# Patient Record
Sex: MALE | Race: WHITE | NOT HISPANIC OR LATINO | Employment: OTHER | URBAN - METROPOLITAN AREA
[De-identification: names, ages, dates, MRNs, and addresses within clinical notes are randomized per-mention and may not be internally consistent; named-entity substitution may affect disease eponyms.]

---

## 2017-01-14 DIAGNOSIS — I10 HYPERTENSION GOAL BP (BLOOD PRESSURE) < 140/90: Primary | ICD-10-CM

## 2017-01-17 NOTE — TELEPHONE ENCOUNTER
LISINOPRIL-HCTZ 10-12.5 MG  Last Written Prescription Date: 5/18/16  Last Fill Quantity: 90, # refills: 1  Last Office Visit with G, P or [facility] prescribing provider: 11/1/16       POTASSIUM   Date Value Ref Range Status   04/14/2015 3.2* 3.4 - 5.3 mmol/L Final     CREATININE   Date Value Ref Range Status   04/14/2015 1.08 0.66 - 1.25 mg/dL Final     BP Readings from Last 3 Encounters:   11/01/16 102/67   05/18/16 130/80   03/09/16 118/90

## 2017-01-18 RX ORDER — LISINOPRIL/HYDROCHLOROTHIAZIDE 10-12.5 MG
TABLET ORAL
Qty: 90 TABLET | Refills: 0 | Status: SHIPPED | OUTPATIENT
Start: 2017-01-18 | End: 2017-05-26

## 2017-01-18 NOTE — TELEPHONE ENCOUNTER
Prescription approved per [facility] Refill Protocol.  Nishi Buchanan RN- Triage [employer]

## 2017-05-24 DIAGNOSIS — I10 HYPERTENSION GOAL BP (BLOOD PRESSURE) < 140/90: ICD-10-CM

## 2017-05-24 NOTE — TELEPHONE ENCOUNTER
lisinopril-hydrochlorothiazide (PRINZIDE/ZESTORETIC) 10-12.5 MG per tablet      Last Written Prescription Date: 1/18/17  Last Fill Quantity: 90, # refills: 0  Last Office Visit with G, [facility] or [facility] prescribing provider: 5/18/16       Potassium   Date Value Ref Range Status   04/14/2015 3.2 (L) 3.4 - 5.3 mmol/L Final     Creatinine   Date Value Ref Range Status   04/14/2015 1.08 0.66 - 1.25 mg/dL Final     BP Readings from Last 3 Encounters:   11/01/16 102/67   05/18/16 130/80   03/09/16 118/90

## 2017-05-26 RX ORDER — LISINOPRIL/HYDROCHLOROTHIAZIDE 10-12.5 MG
TABLET ORAL
Qty: 90 TABLET | Refills: 0 | OUTPATIENT
Start: 2017-05-26

## 2017-05-26 RX ORDER — LISINOPRIL/HYDROCHLOROTHIAZIDE 10-12.5 MG
1 TABLET ORAL DAILY
Qty: 30 TABLET | Refills: 0 | Status: SHIPPED | OUTPATIENT
Start: 2017-05-26 | End: 2017-06-07

## 2017-05-26 NOTE — TELEPHONE ENCOUNTER
Routing refill request to provider for review/approval because:  Daly given x1 and patient did not follow up, please advise

## 2017-06-07 ENCOUNTER — OFFICE VISIT (OUTPATIENT)
Dept: FAMILY MEDICINE | Facility: CLINIC | Age: 57
End: 2017-06-07
Payer: COMMERCIAL

## 2017-06-07 VITALS
DIASTOLIC BLOOD PRESSURE: 68 MMHG | RESPIRATION RATE: 16 BRPM | BODY MASS INDEX: 27.34 KG/M2 | HEART RATE: 78 BPM | SYSTOLIC BLOOD PRESSURE: 100 MMHG | OXYGEN SATURATION: 97 % | WEIGHT: 213 LBS | HEIGHT: 74 IN | TEMPERATURE: 97.3 F

## 2017-06-07 DIAGNOSIS — M19.179 POST-TRAUMATIC OSTEOARTHRITIS OF ANKLE, UNSPECIFIED LATERALITY: ICD-10-CM

## 2017-06-07 DIAGNOSIS — G89.29 CHRONIC PAIN OF LEFT ANKLE: ICD-10-CM

## 2017-06-07 DIAGNOSIS — G89.4 CHRONIC PAIN SYNDROME: ICD-10-CM

## 2017-06-07 DIAGNOSIS — M25.572 CHRONIC PAIN OF LEFT ANKLE: ICD-10-CM

## 2017-06-07 DIAGNOSIS — I10 HYPERTENSION GOAL BP (BLOOD PRESSURE) < 140/90: Primary | ICD-10-CM

## 2017-06-07 DIAGNOSIS — M54.2 CERVICALGIA: ICD-10-CM

## 2017-06-07 DIAGNOSIS — F11.21 OPIOID DEPENDENCE IN REMISSION (H): ICD-10-CM

## 2017-06-07 PROCEDURE — 36415 COLL VENOUS BLD VENIPUNCTURE: CPT | Performed by: FAMILY MEDICINE

## 2017-06-07 PROCEDURE — 80061 LIPID PANEL: CPT | Performed by: FAMILY MEDICINE

## 2017-06-07 PROCEDURE — 86803 HEPATITIS C AB TEST: CPT | Performed by: FAMILY MEDICINE

## 2017-06-07 PROCEDURE — 99214 OFFICE O/P EST MOD 30 MIN: CPT | Performed by: FAMILY MEDICINE

## 2017-06-07 PROCEDURE — 80048 BASIC METABOLIC PNL TOTAL CA: CPT | Performed by: FAMILY MEDICINE

## 2017-06-07 NOTE — MR AVS SNAPSHOT
"              After Visit Summary   6/7/2017    Dakota Myers    MRN: [id number]           Patient Information     Date Of Birth          1960        Visit Information        Provider Department      6/7/2017 3:40 PM Jagruti Ahumada MD [facility]        Today's Diagnoses     Hypertension goal BP (blood pressure) < 140/90    -  1    Opioid dependence in remission (H)        Chronic pain syndrome        Cervicalgia        Post-traumatic osteoarthritis of ankle, unspecified laterality        Chronic pain of left ankle           Follow-ups after your visit        Who to contact     If you have questions or need follow up information about today's clinic visit or your schedule please contact [facility] directly at [phone].  Normal or non-critical lab and imaging results will be communicated to you by MyChart, letter or phone within 4 business days after the clinic has received the results. If you do not hear from us within 7 days, please contact the clinic through "[employer]hart or phone. If you have a critical or abnormal lab result, we will notify you by phone as soon as possible.  Submit refill requests through [employer] or call your pharmacy and they will forward the refill request to us. Please allow 3 business days for your refill to be completed.          Additional Information About Your Visit        MyChart Information     [employer] lets you send messages to your doctor, view your test results, renew your prescriptions, schedule appointments and more. To sign up, go to [email or web address] . Click on \"Log in\" on the left side of the screen, which will take you to the Welcome page. Then click on \"Sign up Now\" on the right side of the page.     You will be asked to enter the access code listed below, as well as some personal information. Please follow the directions to create your username and password.     Your access code is: " [email or web address]  Expires: 2017  4:22 PM     Your access code will  in 90 days. If you need help or a new code, please call your [facility] or [phone].        Care EveryWhere ID     This is your Care EveryWhere ID. This could be used by other organizations to access your [place] medical records  [id number]        Your Vitals Were     Pulse Temperature Respirations Height Pulse Oximetry BMI (Body Mass Index)    78 97.3  F (36.3  C) (Tympanic) 16 6' 2\" (1.88 m) 97% 27.35 kg/m2       Blood Pressure from Last 3 Encounters:   17 100/68   16 102/67   16 130/80    Weight from Last 3 Encounters:   17 213 lb (96.6 kg)   16 213 lb (96.6 kg)   16 214 lb (97.1 kg)              We Performed the Following     Basic metabolic panel     Hepatitis C Screen Reflex to HCV RNA Quant and Genotype     Lipid panel reflex to direct LDL          Today's Medication Changes          These changes are accurate as of: 17  4:22 PM.  If you have any questions, ask your nurse or doctor.               Stop taking these medicines if you haven't already. Please contact your care team if you have questions.     lisinopril-hydrochlorothiazide 10-12.5 MG per tablet   Commonly known as:  PRINZIDE/ZESTORETIC   Stopped by:  Jagruti Ahumada MD                    Primary Care Provider Office Phone # Fax #    Jagruti Ahumada [phone]       [address] [address]        Thank you!     Thank you for choosing [facility]  for your care. Our goal is always to provide you with excellent care. Hearing back from our patients is one way we can continue to improve our services. Please take a few minutes to complete the written survey that you may receive in the mail after your visit with us. Thank you!             Your Updated Medication List - Protect others around you: Learn how to safely use, store and throw " away your medicines at www.disposemymeds.org.      Notice  As of 6/7/2017  4:22 PM    You have not been prescribed any medications.
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
## 2017-06-07 NOTE — LETTER
[facility]  [address]  [address]  [place] [address] [phone]                                                                                                           Dakota Myers  [address] [place] [address]    June 9, 2017      Dear Dakota,    The results of your recent tests were reviewed and are enclosed.   Results for orders placed or performed in visit on 06/07/17   Basic metabolic panel   Result Value Ref Range    Sodium 137 133 - 144 mmol/L    Potassium 4.2 3.4 - 5.3 mmol/L    Chloride 101 94 - 109 mmol/L    Carbon Dioxide 22 20 - 32 mmol/L    Anion Gap 14 3 - 14 mmol/L    Glucose 86 70 - 99 mg/dL    Urea Nitrogen 16 7 - 30 mg/dL    Creatinine 1.17 0.66 - 1.25 mg/dL    GFR Estimate 64 >60 mL/min/1.7m2    GFR Estimate If Black 78 >60 mL/min/1.7m2    Calcium 9.3 8.5 - 10.1 mg/dL   Hepatitis C Screen Reflex to HCV RNA Quant and Genotype   Result Value Ref Range    Hepatitis C Antibody  NR     Nonreactive   Assay performance characteristics have not been established for newborns,   infants, and children     Lipid panel reflex to direct LDL   Result Value Ref Range    Cholesterol 296 (H) <200 mg/dL    Triglycerides 397 (H) <150 mg/dL    HDL Cholesterol 44 >39 mg/dL    LDL Cholesterol Calculated 173 (H) <100 mg/dL    Non HDL Cholesterol 252 (H) <130 mg/dL     It was nice to see you recently!  I wanted to let you know your recent test results - details of the results can be found below. Briefly:     1. Your hep C and glucose results look great; everything is normal.   2. Your cholesterol is creeping up into the range where we think you would benefit from a Statin Cholesterol lowering medication.  I imagine you would NOT want to start this - so look at eating healthier!  More fruits and veggies, fewer processed foods, more fish and whole grains.  Google the Mediterranean Diet for details. Let's keep an eye on it and recheck next year.  If  you DO want to start a statin medication to lower your risk of heart attack and stroke, let me know.     Let me know if you have any questions about this, or other concerns.       Thank you for choosing [facility].  We appreciate the opportunity to serve you and look forward to supporting your healthcare needs in the future.    If you have any questions or concerns, please call me or my staff at [phone].      Sincerely,    Jagruti Ahumada MD

## 2017-06-07 NOTE — NURSING NOTE
"Chief Complaint   Patient presents with     Hypertension       Initial /68  Pulse 78  Temp 97.3  F (36.3  C) (Tympanic)  Resp 16  Ht 6' 2\" (1.88 m)  Wt 213 lb (96.6 kg)  SpO2 97%  BMI 27.35 kg/m2 Estimated body mass index is 27.35 kg/(m^2) as calculated from the following:    Height as of this encounter: 6' 2\" (1.88 m).    Weight as of this encounter: 213 lb (96.6 kg).  Medication Reconciliation: complete   Annita De Jesus CMA    "

## 2017-06-08 LAB
ANION GAP SERPL CALCULATED.3IONS-SCNC: 14 MMOL/L (ref 3–14)
BUN SERPL-MCNC: 16 MG/DL (ref 7–30)
CALCIUM SERPL-MCNC: 9.3 MG/DL (ref 8.5–10.1)
CHLORIDE SERPL-SCNC: 101 MMOL/L (ref 94–109)
CHOLEST SERPL-MCNC: 296 MG/DL
CO2 SERPL-SCNC: 22 MMOL/L (ref 20–32)
CREAT SERPL-MCNC: 1.17 MG/DL (ref 0.66–1.25)
GFR SERPL CREATININE-BSD FRML MDRD: 64 ML/MIN/1.7M2
GLUCOSE SERPL-MCNC: 86 MG/DL (ref 70–99)
HDLC SERPL-MCNC: 44 MG/DL
LDLC SERPL CALC-MCNC: 173 MG/DL
NONHDLC SERPL-MCNC: 252 MG/DL
POTASSIUM SERPL-SCNC: 4.2 MMOL/L (ref 3.4–5.3)
SODIUM SERPL-SCNC: 137 MMOL/L (ref 133–144)
TRIGL SERPL-MCNC: 397 MG/DL

## 2017-06-09 LAB — HCV AB SERPL QL IA: NORMAL

## 2017-06-09 NOTE — PROGRESS NOTES
Hi Team 3:  Please sent a lab result with the following note.  Thank you!    Dear Dakota,    It was nice to see you recently!  I wanted to let you know your recent test results - details of the results can be found below. Briefly:    1. Your hep C and glucose results look great; everything is normal.  2. Your cholesterol is creeping up into the range where we think you would benefit from a Statin Cholesterol lowering medication.  I imagine you would NOT want to start this - so look at eating healthier!  More fruits and veggies, fewer processed foods, more fish and whole grains.  Google the Mediterranean Diet for details. Let's keep an eye on it and recheck next year.  If you DO want to start a statin medication to lower your risk of heart attack and stroke, let me know.    Let me know if you have any questions about this, or other concerns.    Best,  Jagruti Ahumada MD  Family Medicine  [facility]  [phone]         Results for orders placed or performed in visit on 06/07/17  -Basic metabolic panel       Result                                            Value                         Ref Range                       Sodium                                            137                           133 - 144 mmol/L                Potassium                                         4.2                           3.4 - 5.3 mmol/L                Chloride                                          101                           94 - 109 mmol/L                 Carbon Dioxide                                    22                            20 - 32 mmol/L                  Anion Gap                                         14                            3 - 14 mmol/L                   Glucose                                           86                            70 - 99 mg/dL                   Urea Nitrogen                                     16                            7 - 30 mg/dL                     Creatinine                                        1.17                          0.66 - 1.25 mg/dL               GFR Estimate                                      64                            >60 mL/min/1.7m2                GFR Estimate If Black                             78                            >60 mL/min/1.7m2                Calcium                                           9.3                           8.5 - 10.1 mg/dL           -Hepatitis C Screen Reflex to HCV RNA Quant and Genotype       Result                                            Value                         Ref Range                       Hepatitis C Antibody                                                            NR                          Nonreactive    Assay performance characteristics have not been established for newborns,    infants, and children     -Lipid panel reflex to direct LDL       Result                                            Value                         Ref Range                       Cholesterol                                       296 (H)                       <200 mg/dL                      Triglycerides                                     397 (H)                       <150 mg/dL                      HDL Cholesterol                                   44                            >39 mg/dL                       LDL Cholesterol Calculated                        173 (H)                       <100 mg/dL                      Non HDL Cholesterol                               252 (H)                       <130 mg/dL

## 2022-07-26 ENCOUNTER — TELEPHONE (OUTPATIENT)
Dept: FAMILY MEDICINE | Facility: CLINIC | Age: 62
End: 2022-07-26

## 2022-07-26 NOTE — TELEPHONE ENCOUNTER
Reason for Call:  Other appointment    Detailed comments: Please put appt on the wait list for Dr. Ahumada. Patient is hoping to see her before scheduled date which is set for 12/15. He was quite upset that he has not been in for quite some time to see her and is looking to see her asap due to needing some referrals as well.     Phone Number Patient can be reached at: Cell number on file:    Telephone Information:   Mobile [phone]       Best Time: any time throughout the day     Can we leave a detailed message on this number? YES    Call taken on 7/26/2022 at 12:42 PM by Migel Conde

## 2022-12-29 ENCOUNTER — OFFICE VISIT (OUTPATIENT)
Dept: FAMILY MEDICINE | Facility: CLINIC | Age: 62
End: 2022-12-29
Payer: COMMERCIAL

## 2022-12-29 VITALS
OXYGEN SATURATION: 98 % | HEART RATE: 83 BPM | HEIGHT: 74 IN | WEIGHT: 206 LBS | SYSTOLIC BLOOD PRESSURE: 134 MMHG | RESPIRATION RATE: 16 BRPM | BODY MASS INDEX: 26.44 KG/M2 | DIASTOLIC BLOOD PRESSURE: 87 MMHG | TEMPERATURE: 97.4 F

## 2022-12-29 DIAGNOSIS — R03.0 ELEVATED BP WITHOUT DIAGNOSIS OF HYPERTENSION: ICD-10-CM

## 2022-12-29 DIAGNOSIS — Z13.220 SCREENING FOR HYPERLIPIDEMIA: ICD-10-CM

## 2022-12-29 DIAGNOSIS — R97.20 ELEVATED PROSTATE SPECIFIC ANTIGEN (PSA): ICD-10-CM

## 2022-12-29 DIAGNOSIS — Z12.5 SCREENING FOR PROSTATE CANCER: ICD-10-CM

## 2022-12-29 DIAGNOSIS — Z12.11 SCREEN FOR COLON CANCER: ICD-10-CM

## 2022-12-29 DIAGNOSIS — M19.172 POST-TRAUMATIC OSTEOARTHRITIS OF LEFT FOOT: ICD-10-CM

## 2022-12-29 DIAGNOSIS — M25.572 CHRONIC PAIN OF LEFT ANKLE: ICD-10-CM

## 2022-12-29 DIAGNOSIS — G89.29 CHRONIC PAIN OF LEFT ANKLE: ICD-10-CM

## 2022-12-29 DIAGNOSIS — Z00.00 ROUTINE GENERAL MEDICAL EXAMINATION AT A HEALTH CARE FACILITY: Primary | ICD-10-CM

## 2022-12-29 DIAGNOSIS — N20.0 NEPHROLITHIASIS: ICD-10-CM

## 2022-12-29 DIAGNOSIS — M19.179 POST-TRAUMATIC OSTEOARTHRITIS OF ANKLE, UNSPECIFIED LATERALITY: ICD-10-CM

## 2022-12-29 DIAGNOSIS — Z84.89 FAMILY HISTORY OF GENETIC DISEASE: ICD-10-CM

## 2022-12-29 DIAGNOSIS — Z82.0 FAMILY HISTORY OF ALZHEIMER'S DISEASE: ICD-10-CM

## 2022-12-29 LAB
ANION GAP SERPL CALCULATED.3IONS-SCNC: 13 MMOL/L (ref 7–15)
BUN SERPL-MCNC: 18.4 MG/DL (ref 8–23)
CALCIUM SERPL-MCNC: 9.8 MG/DL (ref 8.8–10.2)
CHLORIDE SERPL-SCNC: 103 MMOL/L (ref 98–107)
CHOLEST SERPL-MCNC: 248 MG/DL
CREAT SERPL-MCNC: 1.18 MG/DL (ref 0.67–1.17)
DEPRECATED HCO3 PLAS-SCNC: 25 MMOL/L (ref 22–29)
GFR SERPL CREATININE-BSD FRML MDRD: 70 ML/MIN/1.73M2
GLUCOSE SERPL-MCNC: 92 MG/DL (ref 70–99)
HBA1C MFR BLD: 5.2 % (ref 0–5.6)
HDLC SERPL-MCNC: 40 MG/DL
LDLC SERPL CALC-MCNC: 167 MG/DL
NONHDLC SERPL-MCNC: 208 MG/DL
POTASSIUM SERPL-SCNC: 4 MMOL/L (ref 3.4–5.3)
PSA SERPL-MCNC: 21.9 NG/ML (ref 0–4.5)
SODIUM SERPL-SCNC: 141 MMOL/L (ref 136–145)
TRIGL SERPL-MCNC: 203 MG/DL

## 2022-12-29 PROCEDURE — G0103 PSA SCREENING: HCPCS | Performed by: FAMILY MEDICINE

## 2022-12-29 PROCEDURE — 99214 OFFICE O/P EST MOD 30 MIN: CPT | Mod: 25 | Performed by: FAMILY MEDICINE

## 2022-12-29 PROCEDURE — 80048 BASIC METABOLIC PNL TOTAL CA: CPT | Performed by: FAMILY MEDICINE

## 2022-12-29 PROCEDURE — 99386 PREV VISIT NEW AGE 40-64: CPT | Mod: 25 | Performed by: FAMILY MEDICINE

## 2022-12-29 PROCEDURE — 90472 IMMUNIZATION ADMIN EACH ADD: CPT | Performed by: FAMILY MEDICINE

## 2022-12-29 PROCEDURE — 36415 COLL VENOUS BLD VENIPUNCTURE: CPT | Performed by: FAMILY MEDICINE

## 2022-12-29 PROCEDURE — 80061 LIPID PANEL: CPT | Performed by: FAMILY MEDICINE

## 2022-12-29 PROCEDURE — 90715 TDAP VACCINE 7 YRS/> IM: CPT | Performed by: FAMILY MEDICINE

## 2022-12-29 PROCEDURE — 90750 HZV VACC RECOMBINANT IM: CPT | Performed by: FAMILY MEDICINE

## 2022-12-29 PROCEDURE — 90471 IMMUNIZATION ADMIN: CPT | Performed by: FAMILY MEDICINE

## 2022-12-29 PROCEDURE — 83036 HEMOGLOBIN GLYCOSYLATED A1C: CPT | Performed by: FAMILY MEDICINE

## 2022-12-29 ASSESSMENT — ENCOUNTER SYMPTOMS
CHILLS: 0
NAUSEA: 0
MYALGIAS: 0
PALPITATIONS: 0
SHORTNESS OF BREATH: 0
ARTHRALGIAS: 1
COUGH: 0
HEARTBURN: 0
CONSTIPATION: 0
ABDOMINAL PAIN: 0
DYSURIA: 0
DIARRHEA: 0
JOINT SWELLING: 0
HEMATURIA: 0
SORE THROAT: 0
FREQUENCY: 0
DIZZINESS: 0
EYE PAIN: 0
HEADACHES: 0
HEMATOCHEZIA: 0
NERVOUS/ANXIOUS: 0
FEVER: 0
PARESTHESIAS: 0
WEAKNESS: 0

## 2022-12-29 ASSESSMENT — PAIN SCALES - GENERAL: PAINLEVEL: NO PAIN (0)

## 2022-12-29 NOTE — NURSING NOTE
Prior to immunization administration, verified patients identity using patient s name and date of birth. Please see Immunization Activity for additional information.     Screening Questionnaire for Adult Immunization    Are you sick today?   No   Do you have allergies to medications, food, a vaccine component or latex?   No   Have you ever had a serious reaction after receiving a vaccination?   No   Do you have a long-term health problem with heart, lung, kidney, or metabolic disease (e.g., diabetes), asthma, a blood disorder, no spleen, complement component deficiency, a cochlear implant, or a spinal fluid leak?  Are you on long-term aspirin therapy?   No   Do you have cancer, leukemia, HIV/AIDS, or any other immune system problem?   No   Do you have a parent, brother, or sister with an immune system problem?   No   In the past 3 months, have you taken medications that affect  your immune system, such as prednisone, other steroids, or anticancer drugs; drugs for the treatment of rheumatoid arthritis, Crohn s disease, or psoriasis; or have you had radiation treatments?   No   Have you had a seizure, or a brain or other nervous system problem?   No   During the past year, have you received a transfusion of blood or blood    products, or been given immune (gamma) globulin or antiviral drug?   No   For women: Are you pregnant or is there a chance you could become       pregnant during the next month?   No   Have you received any vaccinations in the past 4 weeks?   No     Immunization questionnaire answers were all negative.        Per orders of Dr. gaytan, injection of tdap and shingles given by Emilie Mustafa MA. Patient instructed to remain in clinic for 15 minutes afterwards, and to report any adverse reaction to me immediately.       Screening performed by Emilie Mustafa MA on 12/29/2022 at 8:47 AM.

## 2022-12-29 NOTE — PATIENT INSTRUCTIONS
For your blood pressure:  Check your blood pressure first thing in the morning after going to the bathroom, before eating, sitting quietly x 5 minutes  Aim for 3 times a week  Note your blood pressure on a paper log or on your phone  In ~2 weeks, send me a message on [employer] with your results.  Your goal is <140/90, even better is <130/80.  Low sodium, high potassium (DASH) diet.    Think of Nature as a multivitamin that you should take every day.  Make an effort to spend time outside every day.    If you spend [place] in [place] vitamin D 400-1000 daily is a good idea October-April.    Social connections are also like a multivitamin; they give us micro-boosts that keep us healthy and happy. Talk to the check out person in the store, connect with your neighbors.  Make an effort to maintain and sustain social connections in your life.    Food is Medicine. The MIND or Mediterranean diet are the best for heart and brain health as well as have a lower lifetime cancer risk.    Weight lifting exercise 2-3x per week is excellent for bone health and maintaining muscle mass, particularly if you are over 40.  It's also helpful in reducing anxiety and boosting mood.    Move your body every day (exercise!).  Exercise is the most effective way to boost mood, reduce anxiety and depression, reduce risks of many chronic diseases and age well.  Find something you enjoy and do it regularly (walk, run, take a dance class, join a gym, ski, roller-blade, get into yoga, learn ellie chi...)    Prioritize your sleep! Adults age 26-64 need 7-9 hours of sleep a night.  Older adults 65+ need 7-8 hours of sleep a night.  Studies show that people who sleep seven hours a night are healthier and live longer. Sleeping less than seven hours is associated with a range of health problems including obesity, dementia, heart disease, depression and impaired immune function.    Patient Education      Preventive Health Recommendations  Male Ages 50 -  64    Yearly exam:             See your health care provider every year in order to  o   Review health changes.   o   Discuss preventive care.    o   Review your medicines if your doctor has prescribed any.   Have a cholesterol test every 5 years, or more frequently if you are at risk for high cholesterol/heart disease.   Have a diabetes test (fasting glucose) every three years. If you are at risk for diabetes, you should have this test more often.   Have a colonoscopy at age 50, or have a yearly FIT test (stool test). These exams will check for colon cancer.    Talk with your health care provider about whether or not a prostate cancer screening test (PSA) is right for you.  You should be tested each year for STDs (sexually transmitted diseases), if you re at risk.     Shots: Get a flu shot each year. Get a tetanus shot every 10 years.     Nutrition:  Eat at least 5 servings of fruits and vegetables daily.   Eat whole-grain bread, whole-wheat pasta and brown rice instead of white grains and rice.   Get adequate Calcium and Vitamin D.     Lifestyle  Exercise for at least 150 minutes a week (30 minutes a day, 5 days a week). This will help you control your weight and prevent disease.   Limit alcohol to one drink per day.   No smoking.   Wear sunscreen to prevent skin cancer.   See your dentist every six months for an exam and cleaning.   See your eye doctor every 1 to 2 years.

## 2022-12-29 NOTE — PROGRESS NOTES
SUBJECTIVE:   CC: Dakota is an 62 year old who presents for preventative health visit.   Patient has been advised of split billing requirements and indicates understanding: Yes  Healthy Habits:     Getting at least 3 servings of Calcium per day:  Yes    Bi-annual eye exam:  NO    Dental care twice a year:  Yes    Sleep apnea or symptoms of sleep apnea:  None    Diet:  Vegetarian/vegan    Frequency of exercise:  1 day/week    Duration of exercise:  Less than 15 minutes    Taking medications regularly:  Yes    Medication side effects:  None    PHQ-2 Total Score: 0    Additional concerns today:  Yes    62 year old who I haven't seen since 2017.  Also:    1. Due for colonoscopy.    2. blood pressure was high in [place].  Was running late that day.  Might have cuff at home, hasn't been checking.  BP Readings from Last 6 Encounters:   12/29/22 134/87   06/07/17 100/68   11/01/16 102/67   05/18/16 130/80   03/09/16 118/90   10/28/15 (!) 153/97     3. Referral for orthotics for shoes.  Chronic ankle and foot pain from posttraumatic arthritis    4. Dad had alzheimer's in 60s.  Wants baseline testing cog function    5. [facility] study - they are going to test him for gene for Alzheimer's.  Thoughts?    6. Sister - recommended genetic testing for HRCCC - she was heterozygous.  He does have leiomyomas    Today's PHQ-2 Score:   PHQ-2 ( 1999 [employer]) 12/29/2022   Q1: Little interest or pleasure in doing things 0   Q2: Feeling down, depressed or hopeless 0   PHQ-2 Score 0   Q1: Little interest or pleasure in doing things Not at all   Q2: Feeling down, depressed or hopeless Not at all   PHQ-2 Score 0       Have you ever done Advance Care Planning? (For example, a Health Directive, POLST, or a discussion with a medical provider or your loved ones about your wishes): No, advance care planning information given to patient to review.  Patient plans to discuss their wishes with loved ones or provider.      Social History     Tobacco Use      "Smoking status: Former     Years: 15.00     Types: Cigarettes     Quit date: 10/8/2010     Years since quittin.2     Smokeless tobacco: Never   Substance Use Topics     Alcohol use: Yes     Comment: 3-4 per week     If you drink alcohol do you typically have >3 drinks per day or >7 drinks per week? No    Alcohol Use 2022   Prescreen: >3 drinks/day or >7 drinks/week? Not Applicable   Prescreen: >3 drinks/day or >7 drinks/week? -       Last PSA: No results found for: PSA    Reviewed orders with patient. Reviewed health maintenance and updated orders accordingly - Yes      Reviewed and updated as needed this visit by clinical staff   Tobacco  Allergies  Meds  Problems  Med Hx  Surg Hx  Fam Hx          Reviewed and updated as needed this visit by Provider   Tobacco   Meds  Problems  Med Hx  Surg Hx  Fam Hx             Review of Systems   Constitutional: Negative for chills and fever.   HENT: Negative for congestion, ear pain, hearing loss and sore throat.    Eyes: Negative for pain and visual disturbance.   Respiratory: Negative for cough and shortness of breath.    Cardiovascular: Negative for chest pain, palpitations and peripheral edema.   Gastrointestinal: Negative for abdominal pain, constipation, diarrhea, heartburn, hematochezia and nausea.   Genitourinary: Negative for dysuria, frequency, genital sores, hematuria, impotence, penile discharge and urgency.   Musculoskeletal: Positive for arthralgias. Negative for joint swelling and myalgias.   Skin: Negative for rash.   Neurological: Negative for dizziness, weakness, headaches and paresthesias.   Psychiatric/Behavioral: Negative for mood changes. The patient is not nervous/anxious.          OBJECTIVE:   /87 (BP Location: Right arm, Patient Position: Sitting, Cuff Size: Adult Regular)   Pulse 83   Temp 97.4  F (36.3  C) (Tympanic)   Resp 16   Ht 1.88 m (6' 2\")   Wt 93.4 kg (206 lb)   SpO2 98%   BMI 26.45 kg/m      Physical " Exam  GENERAL: healthy, alert and no distress  EYES: Eyes grossly normal to inspection, PERRL and conjunctivae and sclerae normal  HENT: ear canals and TM's normal, nose and mouth without ulcers or lesions  NECK: no adenopathy, no asymmetry, masses, or scars and thyroid normal to palpation  RESP: lungs clear to auscultation - no rales, rhonchi or wheezes  CV: regular rate and rhythm, normal S1 S2, no S3 or S4, no murmur, click or rub, no peripheral edema and peripheral pulses strong  ABDOMEN: soft, nontender, no hepatosplenomegaly, no masses and bowel sounds normal  MS: no gross musculoskeletal defects noted, no edema  SKIN: leimyomas left upper arm  NEURO: Normal strength and tone, mentation intact and speech normal  PSYCH: mentation appears normal, affect normal/bright    Diagnostic Test Results:  Labs reviewed in Epic    ASSESSMENT/PLAN:   Dakota was seen today for physical.    Diagnoses and all orders for this visit:    Routine general medical examination at a health care facility    PSA: screening, done today    Colon: due, last in 2016 and on q5 yr, ordered    Immunizations: due for Shingrix, TDAP.  Done today    Discussed healthy lifestyle and aging recommendations including regular exercise, adequate and regular sleep, 5+ fruits and veggies daily. He is good with all of above except perhaps exercise, we discussed some strategies for increasing  -     Hemoglobin A1c; Future  -     Hemoglobin A1c    Elevated outside blood pressure without diagnosis of hypertension goal BP (blood pressure) < 140/90    Normotensive today    Plan in patient instructions:  Patient Instructions   For your blood pressure:  Check your blood pressure first thing in the morning after going to the bathroom, before eating, sitting quietly x 5 minutes  Aim for 3 times a week  Note your blood pressure on a paper log or on your phone  In ~2 weeks, send me a message on eTutor with your results.  Your goal is <140/90, even better is  "<130/80.  Low sodium, high potassium (DASH) diet.  -     BASIC METABOLIC PANEL; Future  -     BASIC METABOLIC PANEL    Screen for colon cancer  -     Colonoscopy Screening  Referral; Future    Screening for hyperlipidemia  -     Lipid panel reflex to direct LDL Non-fasting; Future  -     Lipid panel reflex to direct LDL Non-fasting    Family history of genetic disease - HLRCC (hereditary leiomyomatosis and renal cell carcinoma)  Comments:  Sister  Rare  Referred genetics  May have as has leiomyomatosis - reviewed chart and in 2013 we offered testing for this per derm visit.  If positive will defer to genetics for recs for RCC screening.  Orders:  -     Adult Oncology/Hematology  Referral; Future    Family history of Alzheimer's disease - dad, in 60's    No symptoms, but would like baseline cognitive testing as concerned    Ordered     Discussed MIND diet (he is already doing essentially, vegan and whole plants based)  -     Adult Neuropsychology Referral; Future    Chronic pain of left ankle  Post-traumatic osteoarthritis of ankle, unspecified laterality  Post-traumatic osteoarthritis of left foot    Orthotics ordered  -     Orthotics and Prosthetics DME Orthotic; Foot Orthotics; Bilateral    Other orders  -     REVIEW OF HEALTH MAINTENANCE PROTOCOL ORDERS  -     ZOSTER VACCINE RECOMBINANT ADJUVANTED (SHINGRIX)  -     TDAP VACCINE (Adacel, Boostrix)        Patient has been advised of split billing requirements and indicates understanding: Yes      COUNSELING:   Reviewed preventive health counseling, as reflected in patient instructions      BMI:   Estimated body mass index is 26.45 kg/m  as calculated from the following:    Height as of this encounter: 1.88 m (6' 2\").    Weight as of this encounter: 93.4 kg (206 lb).   Weight management plan: Discussed healthy diet and exercise guidelines      He reports that he quit smoking about 12 years ago. His smoking use included cigarettes. He has never used " smokeless tobacco.        Jagruti Ahumada MD  [facility]

## 2022-12-30 ENCOUNTER — MYC MEDICAL ADVICE (OUTPATIENT)
Dept: FAMILY MEDICINE | Facility: CLINIC | Age: 62
End: 2022-12-30

## 2022-12-31 NOTE — TELEPHONE ENCOUNTER
I called and spoke with Dakota.  Explained elevated PSA and that it's a screening test, not a diagnosis of cancer but when this elevated (>20) we're more concerned and I want him to see urology within next 2 weeks.    As far as genetic tests go, we discussed and he'd like to put this on the back burner for now as wants to address elevated PSA first.      Answered all questions, let him know to reach out if he had further questions OR if he doesn't get urology appointment within 2 wks.    Dr. Jagruti Ahumada MD / [facility]

## 2023-01-02 ENCOUNTER — MYC MEDICAL ADVICE (OUTPATIENT)
Dept: CALL CENTER | Age: 63
End: 2023-01-02

## 2023-01-02 ENCOUNTER — TELEPHONE (OUTPATIENT)
Dept: GASTROENTEROLOGY | Facility: CLINIC | Age: 63
End: 2023-01-02

## 2023-01-02 ENCOUNTER — TELEPHONE (OUTPATIENT)
Dept: UROLOGY | Facility: CLINIC | Age: 63
End: 2023-01-02

## 2023-01-02 NOTE — TELEPHONE ENCOUNTER
[facility] Call Center    Phone Message    May a detailed message be left on voicemail: yes     Reason for Call: Appointment Intake    Referring Provider Name: Jagruti Ahumada MD  Diagnosis and/or Symptoms:     Elevated prostate specific antigen (PSA)  PSA >20 - new testing and dx.    Patient being referred for Urgent Appointment (3-5 days) by referring provider. Sending encounter message for clinic review and follow-up with patient for scheduling. Thank you!    Action Taken: Message routed to:  [facility] ([facility] Urology    Travel Screening: Not Applicable

## 2023-01-02 NOTE — TELEPHONE ENCOUNTER
Screening Questions  BLUE  KIND OF PREP RED  LOCATION [review exclusion criteria] GREEN  SEDATION TYPE        Y Are you active on mychart?       [place] Ordering/Referring Provider?         What type of coverage do you have?      N Have you had a positive covid test in the last 14 days?     26.6 1. BMI  [BMI 40+ - review exclusion criteria]    Y  2. Are you able to give consent for your medical care? [IF NO,RN REVIEW]        N  3. Are you taking any prescription pain medications on a routine schedule?        3a. EXTENDED PREP What kind of prescription?     N 4. Do you have any chemical dependencies such as alcohol, street drugs, or methadone?    N 5. Do you have any history of post-traumatic stress syndrome, severe anxiety or history of psychosis?      **If yes 3- 5 , please schedule with MAC sedation.**          IF YES TO ANY 6 - 10 - HOSPITAL SETTING ONLY.     N 6.   Do you need assistance transferring?     N 7.   Have you had a heart or lung transplant?    N 8.   Are you currently on dialysis?   N 9.   Do you use daily home oxygen?   N 10. Do you take nitroglycerin?   10a.  If yes, how often?     11. [FEMALES]   Are you currently pregnant?    11a.  If yes, how many weeks? [ Greater than 12 weeks, OR NEEDED]    N 12. Do you have Pulmonary Hypertension? *NEED PAC APPT AT [facility]*     N 13. [review exclusion criteria]  Do you have any implantable devices in your body (pacemaker, defib, LVAD)?    N 14. In the past 6 months, have you had any heart related issues including cardiomyopathy or heart attack?     14a.  If yes, did it require cardiac stenting if so when?     N 15. Have you had a stroke or Transient ischemic attack (TIA - aka  mini stroke ) within 6 months?      N 16. Do you have mod to severe Obstructive Sleep Apnea?  [Hospital only - Ok at [facility]]    N 17. Do you have SEVERE AND UNCONTROLLED asthma? *NEED PAC APPT AT [facility]*     N 18. Are you currently taking any blood thinners?     18a. If yes, inform  "patient to \"follow up w/ ordering provider for bridging instructions.\"    N 19. Do you take the medication Phentermine?    19a. If yes, \"Hold for 7 days before procedure.  Please consult your prescribing provider if you have questions about holding this medication.\"     N  20. Do you have chronic kidney disease?      N  21. Do you have a diagnosis of diabetes?     N  22. On a regular basis do you go 3-5 days between bowel movements?      23. Preferred LOCAL Pharmacy for Pre Prescription    [ LIST ONLY ONE PHARMACY]     [facility] [id number] [place] [facility] - [place], [place] - [address]    - CLOSING REMINDERS -    Informed patient they will need an adult    Cannot take any type of public or medical transportation alone    Conscious Sedation- Needs  for 6 hours after the procedure       MAC/General-Needs  for 24 hours after procedure    Pre-Procedure Covid test to be completed [[facility] PCR Testing Required]    Confirmed Nurse will call to complete assessment       - SCHEDULING DETAILS -  NO Hospital Setting Required? If yes, what is the exclusion?:    BRITTANI  Surgeon    2/6  Date of Procedure  Lower Endoscopy [Colonoscopy]  Type of Procedure Scheduled  [facility]-If you answer yes to questions #8, #20, #21Which Colonoscopy Prep was Sent?     CS Sedation Type     N PAC / Pre-op Required       "

## 2023-01-03 NOTE — TELEPHONE ENCOUNTER
Writer called and spoke to pt. Pt did not see the purpose of a video visit but writer explained per policy this is preferred since a physical exam is not done, more so of a consult and conversation is done reviewing hx and discussing potential for MRI and bx if that. Pt ultimately okay with a VV, writer scheduled, writer sending message 1-2 days before appt to remind pt of visit as requested.

## 2023-01-04 ENCOUNTER — PRE VISIT (OUTPATIENT)
Dept: UROLOGY | Facility: CLINIC | Age: 63
End: 2023-01-04

## 2023-01-04 NOTE — TELEPHONE ENCOUNTER
MEDICAL RECORDS REQUEST   [facility]  Urology Clinic  [address] [place] [address]  PHONE: [phone]  Fax: [phone]        FUTURE VISIT INFORMATION                                                   Dakota Myers, : 1960 scheduled for future visit at [facility]    APPOINTMENT INFORMATION:    Date: 2023    Provider:  Shabbir Gerard MD    Reason for Visit/Diagnosis: elevated PSA    REFERRAL INFORMATION:    Referring provider:  Jagruti Ahumada MD in  [facility]    RECORDS REQUESTED FOR VISIT                                                     NOTES  STATUS/DETAILS   OFFICE NOTE from referring provider  yes, 2022 -- Jagruti Ahumada MD in  [facility]   MEDICATION LIST  no   LABS     PSA (LAB)  yes     PRE-VISIT CHECKLIST      Record collection complete Yes   Appointment appropriately scheduled           (right time/right provider) Yes   Joint diagnostic appointment coordinated correctly          (ensure right order & amount of time) Yes   MyChart activation Yes   Questionnaire complete If no, please explain pending

## 2023-01-04 NOTE — CONFIDENTIAL NOTE
Reason for visit: Reason for visit: consult    Relevant information: elevated psa    Records/imaging/labs/orders: in epic    At Rooming: video    Arabella Gutierrez  1/4/2023  5:11 PM

## 2023-01-12 ENCOUNTER — PRE VISIT (OUTPATIENT)
Dept: UROLOGY | Facility: CLINIC | Age: 63
End: 2023-01-12

## 2023-01-12 ENCOUNTER — VIRTUAL VISIT (OUTPATIENT)
Dept: UROLOGY | Facility: CLINIC | Age: 63
End: 2023-01-12
Attending: FAMILY MEDICINE
Payer: COMMERCIAL

## 2023-01-12 DIAGNOSIS — R97.20 ELEVATED PROSTATE SPECIFIC ANTIGEN (PSA): ICD-10-CM

## 2023-01-12 PROCEDURE — 99204 OFFICE O/P NEW MOD 45 MIN: CPT | Mod: GT | Performed by: UROLOGY
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
## 2023-01-25 ENCOUNTER — TELEPHONE (OUTPATIENT)
Dept: GASTROENTEROLOGY | Facility: CLINIC | Age: 63
End: 2023-01-25

## 2023-01-25 DIAGNOSIS — Z12.11 ENCOUNTER FOR SCREENING COLONOSCOPY: Primary | ICD-10-CM

## 2023-01-25 RX ORDER — BISACODYL 5 MG/1
TABLET, DELAYED RELEASE ORAL
Qty: 4 TABLET | Refills: 0 | Status: SHIPPED | OUTPATIENT
Start: 2023-01-25 | End: 2023-03-17

## 2023-01-25 NOTE — TELEPHONE ENCOUNTER
Pre assessment questions completed for upcoming Colonoscopy  procedure scheduled on 2/6/23    COVID policy reviewed.     Pre-op scheduled  N/A    Reviewed procedural arrival time 0745, procedure time 0845 and facility location [facility]; [address] [place] [address]    Designated  policy reviewed. Instructed to have someone stay 6 hours post procedure.     Anticoagulation/blood thinners? No    Electronic implanted devices? No    Diabetic? No    Procedure indication: screening, hx of polyps    Bowel prep recommendation: Standard Golytely     Reviewed procedure prep instructions.     Prep instructions sent via [employer].  Bowel prep script sent to [facility] [id number] [place], [place] - [address] [place] [address].     Patient verbalized understanding and had no questions or concerns at this time.    Vidya Jeff RN  Endoscopy Procedure Pre Assessment RN

## 2023-01-25 NOTE — TELEPHONE ENCOUNTER
Attempted to contact patient regarding upcoming Colonoscopy  procedure on 2/6/20223 for pre assessment questions. No answer.     Left message to return call to [phone] #[phone]    Discuss Covid policy and designated  policy.    Mahi Garcia RN  Endoscopy Procedure Pre Assessment RN                Patient scheduled for Colonoscopy  on 2/6/2023.     Discuss Covid policy.     Pre op exam scheduled: N/A    Arrival time: 0745. Procedure time 0845    Facility location: [facility]; [address] [place] [address]    Sedation type: Conscious sedation     Anticoagulations? No    Electronic implanted devices? No    Diabetic? No    Indication for procedure: screening, hx of polyps    Bowel prep recommendation: Standard Golytely     Prep instructions sent via [employer] Bowel prep script sent to [facility] [id number] [place], [place] - [address] [place] [address]    Pre visit planning completed.    Mahi Garcia RN  Endoscopy Procedure Pre Assessment RN

## 2023-01-31 ENCOUNTER — HOSPITAL ENCOUNTER (OUTPATIENT)
Dept: MRI IMAGING | Facility: CLINIC | Age: 63
Discharge: HOME | End: 2023-01-31
Attending: UROLOGY | Admitting: UROLOGY
Payer: COMMERCIAL

## 2023-01-31 DIAGNOSIS — R97.20 ELEVATED PROSTATE SPECIFIC ANTIGEN (PSA): ICD-10-CM

## 2023-01-31 PROCEDURE — A9585 GADOBUTROL INJECTION: HCPCS | Performed by: UROLOGY

## 2023-01-31 PROCEDURE — 72197 MRI PELVIS W/O & W/DYE: CPT

## 2023-01-31 PROCEDURE — 72197 MRI PELVIS W/O & W/DYE: CPT | Mod: 26 | Performed by: RADIOLOGY

## 2023-01-31 PROCEDURE — 255N000002 HC RX 255 OP 636: Performed by: UROLOGY

## 2023-01-31 RX ORDER — GADOBUTROL 604.72 MG/ML
10 INJECTION INTRAVENOUS ONCE
Status: COMPLETED | OUTPATIENT
Start: 2023-01-31 | End: 2023-01-31

## 2023-01-31 RX ADMIN — GADOBUTROL 9 ML: 604.72 INJECTION INTRAVENOUS at 09:39

## 2023-02-01 ENCOUNTER — TELEPHONE (OUTPATIENT)
Dept: UROLOGY | Facility: CLINIC | Age: 63
End: 2023-02-01

## 2023-02-01 NOTE — TELEPHONE ENCOUNTER
"Pt called in regarding colonoscopy procedure scheduled on 2/6/23.    Pt upset about having to do Golytely prep. Asking for Miralax/Gatorade prep, stating he did that last time. Informed pt reason for Golytely prep is due to the Magnesium Citrate recall. Informed pt Miralax/Gatorade cannot be used due to the recall. Pt remains upset about having to do the Golytely and states \"it is so salty and gross and such a large quantity\". Pt asking if he can complete the Miralax/Gatorade prep without Mag citrate.     Message sent to scoping provider asking if Miralax/Gatorade without mag citrate can be used.    Awaiting response.    Instructed pt to plan to proceed with Golytely however we will contact him with response from scoping provider.      Kate Richmond RN  Endoscopy Procedure Pre-Assessment RN      "

## 2023-02-01 NOTE — TELEPHONE ENCOUNTER
[facility] Call Center    Phone Message    May a detailed message be left on voicemail: yes     Reason for Call: Patient is calling to schedule Fusion Prostate Biopsy. Writer not able to locate appt in [place] until May 2023. Wondering if he can get in sooner. Either location is fine. Please reach out. Thank you    Action Taken: Message routed to:  [facility] U[facility]    Travel Screening: Not Applicable

## 2023-02-02 DIAGNOSIS — Z29.89 NEED FOR PROPHYLAXIS AGAINST URINARY TRACT INFECTION: Primary | ICD-10-CM

## 2023-02-02 DIAGNOSIS — Z79.2 PROPHYLACTIC ANTIBIOTIC: ICD-10-CM

## 2023-02-02 RX ORDER — CIPROFLOXACIN 500 MG/1
500 TABLET, FILM COATED ORAL 2 TIMES DAILY
Qty: 6 TABLET | Refills: 0 | Status: SHIPPED | OUTPATIENT
Start: 2023-03-01 | End: 2023-03-04

## 2023-02-02 NOTE — TELEPHONE ENCOUNTER
Discussed Miralax/Gatorade prep with patient. Approved for use by Dr. Iglesias without mag citrate.    Pt to start clear liquid diet two days before exam starting at 12:00 PM.    Pt verbalized understanding and had no further questions.    Updated [employer] instructions sent.     Noticed new arrival time of 0800 after call ended; included new arrival time in [employer] message.       Kate Richmond, TANESHA  Endoscopy Procedure Pre-Assessment RN

## 2023-02-02 NOTE — TELEPHONE ENCOUNTER
Ramon Iglesias MD Mork, Kate, RN  Yes, he can proceed with Miralax/Gatorade prep. I would advise at least a day and half of clears to help with the prep.     Regards,   Kelsie     Will attempt to call patient later today to discuss Miralax/Gatorade prep. Starting clear liquids two days before exam at 12:00 PM.     Kate Richmond, RN  Endoscopy Procedure Pre-Assessment RN

## 2023-02-06 ENCOUNTER — HOSPITAL ENCOUNTER (OUTPATIENT)
Facility: CLINIC | Age: 63
Discharge: HOME | End: 2023-02-06
Attending: INTERNAL MEDICINE | Admitting: INTERNAL MEDICINE
Payer: COMMERCIAL

## 2023-02-06 VITALS
RESPIRATION RATE: 14 BRPM | DIASTOLIC BLOOD PRESSURE: 79 MMHG | HEART RATE: 56 BPM | SYSTOLIC BLOOD PRESSURE: 116 MMHG | OXYGEN SATURATION: 93 %

## 2023-02-06 LAB — COLONOSCOPY: NORMAL

## 2023-02-06 PROCEDURE — 99153 MOD SED SAME PHYS/QHP EA: CPT | Performed by: INTERNAL MEDICINE

## 2023-02-06 PROCEDURE — 88305 TISSUE EXAM BY PATHOLOGIST: CPT | Mod: 26 | Performed by: STUDENT IN AN ORGANIZED HEALTH CARE EDUCATION/TRAINING PROGRAM

## 2023-02-06 PROCEDURE — 88305 TISSUE EXAM BY PATHOLOGIST: CPT | Mod: TC | Performed by: INTERNAL MEDICINE

## 2023-02-06 PROCEDURE — 999N000099 HC STATISTIC MODERATE SEDATION < 10 MIN: Performed by: INTERNAL MEDICINE

## 2023-02-06 PROCEDURE — 45385 COLONOSCOPY W/LESION REMOVAL: CPT | Mod: PT | Performed by: INTERNAL MEDICINE

## 2023-02-06 PROCEDURE — 250N000011 HC RX IP 250 OP 636: Performed by: INTERNAL MEDICINE

## 2023-02-06 PROCEDURE — 45380 COLONOSCOPY AND BIOPSY: CPT | Performed by: INTERNAL MEDICINE

## 2023-02-06 PROCEDURE — G0500 MOD SEDAT ENDO SERVICE >5YRS: HCPCS | Performed by: INTERNAL MEDICINE

## 2023-02-06 RX ORDER — ONDANSETRON 2 MG/ML
4 INJECTION INTRAMUSCULAR; INTRAVENOUS EVERY 6 HOURS PRN
Status: DISCONTINUED | OUTPATIENT
Start: 2023-02-06 | End: 2023-02-06 | Stop reason: HOSPADM

## 2023-02-06 RX ORDER — FENTANYL CITRATE 50 UG/ML
INJECTION, SOLUTION INTRAMUSCULAR; INTRAVENOUS PRN
Status: DISCONTINUED | OUTPATIENT
Start: 2023-02-06 | End: 2023-02-06 | Stop reason: HOSPADM

## 2023-02-06 RX ORDER — LIDOCAINE 40 MG/G
CREAM TOPICAL
Status: DISCONTINUED | OUTPATIENT
Start: 2023-02-06 | End: 2023-02-06 | Stop reason: HOSPADM

## 2023-02-06 RX ORDER — ONDANSETRON 2 MG/ML
4 INJECTION INTRAMUSCULAR; INTRAVENOUS
Status: DISCONTINUED | OUTPATIENT
Start: 2023-02-06 | End: 2023-02-06 | Stop reason: HOSPADM

## 2023-02-06 RX ORDER — NALOXONE HYDROCHLORIDE 0.4 MG/ML
0.2 INJECTION, SOLUTION INTRAMUSCULAR; INTRAVENOUS; SUBCUTANEOUS
Status: DISCONTINUED | OUTPATIENT
Start: 2023-02-06 | End: 2023-02-06 | Stop reason: HOSPADM

## 2023-02-06 RX ORDER — ONDANSETRON 4 MG/1
4 TABLET, ORALLY DISINTEGRATING ORAL EVERY 6 HOURS PRN
Status: DISCONTINUED | OUTPATIENT
Start: 2023-02-06 | End: 2023-02-06 | Stop reason: HOSPADM

## 2023-02-06 RX ORDER — NALOXONE HYDROCHLORIDE 0.4 MG/ML
0.4 INJECTION, SOLUTION INTRAMUSCULAR; INTRAVENOUS; SUBCUTANEOUS
Status: DISCONTINUED | OUTPATIENT
Start: 2023-02-06 | End: 2023-02-06 | Stop reason: HOSPADM

## 2023-02-06 RX ORDER — PROCHLORPERAZINE MALEATE 5 MG
10 TABLET ORAL EVERY 6 HOURS PRN
Status: DISCONTINUED | OUTPATIENT
Start: 2023-02-06 | End: 2023-02-06 | Stop reason: HOSPADM

## 2023-02-06 RX ORDER — FLUMAZENIL 0.1 MG/ML
0.2 INJECTION, SOLUTION INTRAVENOUS
Status: DISCONTINUED | OUTPATIENT
Start: 2023-02-06 | End: 2023-02-06 | Stop reason: HOSPADM

## 2023-02-06 ASSESSMENT — ACTIVITIES OF DAILY LIVING (ADL): ADLS_ACUITY_SCORE: 35

## 2023-02-06 NOTE — LETTER
"February 10, 2023      Dakota Myers  [address] [facility] [address]        Dear ,    We are writing to inform you of your test results.    I took one small polyp during your last colonoscopy. The pathology results came back as tubular adenoma with no high risk features. This means that you will need repeat colonoscopy in 5 years.     Resulted Orders   Surgical Pathology Exam   Result Value Ref Range    Case Report       Surgical Pathology Report                         Case: [id number]                                  Authorizing Provider:  Ramon Iglesias MD      Collected:           02/06/2023 09:21 AM          Ordering Location:     [facility]          Received:            02/06/2023 09:36 AM                                 Endoscopy                                                                    Pathologist:           Joanie Segura MD                                                          Specimen:    Large Intestine, Colon, Descending, Colon polyp, descending                                Final Diagnosis       DESCENDING COLON, POLYP, BIOPSY:   - Tubular adenoma; negative for high-grade dysplasia       Clinical Information        Screen for colon cancer      Gross Description       A(1). Large Intestine, Colon, Descending, Colon polyp, descending:  The specimen is received in formalin with proper patient identification, labeled \"colon polyp, descending\".  The specimen consists of 3 pieces of tan-white to tan-red soft tissue ranging from 0.1 to 0.3 cm in greatest dimension.  Submitted in A1.       Microscopic Description       Microscopic examination has been performed.        Performing Labs       The technical component of this testing was completed at [facility]      Case Images         If you have any questions or concerns, please call the clinic at the number listed above.       Sincerely,      Ramon " MD Kelsie

## 2023-02-06 NOTE — OR NURSING
Patient underwent a colonoscopy with polypectomy. The procedure was done under moderate sedation. Patient was transferred to Unit 3C, report given.
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
## 2023-02-09 LAB
PATH REPORT.COMMENTS IMP SPEC: NORMAL
PATH REPORT.COMMENTS IMP SPEC: NORMAL
PATH REPORT.FINAL DX SPEC: NORMAL
PATH REPORT.GROSS SPEC: NORMAL
PATH REPORT.MICROSCOPIC SPEC OTHER STN: NORMAL
PATH REPORT.RELEVANT HX SPEC: NORMAL
PHOTO IMAGE: NORMAL

## 2023-03-02 ENCOUNTER — OFFICE VISIT (OUTPATIENT)
Dept: UROLOGY | Facility: CLINIC | Age: 63
End: 2023-03-02
Payer: COMMERCIAL

## 2023-03-02 VITALS
WEIGHT: 205 LBS | SYSTOLIC BLOOD PRESSURE: 128 MMHG | HEIGHT: 74 IN | BODY MASS INDEX: 26.31 KG/M2 | HEART RATE: 70 BPM | DIASTOLIC BLOOD PRESSURE: 84 MMHG | OXYGEN SATURATION: 98 %

## 2023-03-02 DIAGNOSIS — R97.20 ELEVATED PROSTATE SPECIFIC ANTIGEN (PSA): Primary | ICD-10-CM

## 2023-03-02 PROCEDURE — 76872 US TRANSRECTAL: CPT | Mod: 26 | Performed by: UROLOGY

## 2023-03-02 PROCEDURE — 55700 PR BIOPSY OF PROSTATE,NEEDLE/PUNCH: CPT | Performed by: UROLOGY

## 2023-03-02 PROCEDURE — 88344 IMHCHEM/IMCYTCHM EA MLT ANTB: CPT

## 2023-03-02 PROCEDURE — 88305 TISSUE EXAM BY PATHOLOGIST: CPT | Mod: 59

## 2023-03-02 PROCEDURE — 76999 ECHO EXAMINATION PROCEDURE: CPT | Performed by: UROLOGY

## 2023-03-02 ASSESSMENT — PAIN SCALES - GENERAL
PAINLEVEL: NO PAIN (0)
PAINLEVEL: NO PAIN (0)

## 2023-03-02 NOTE — NURSING NOTE
Chief Complaint   Patient presents with     Elevated PSA     Here for transrectal ultrasound mri guided prostate biopsy       Procedure was explained to patient prior to performing said procedure. The patient signed the consent form and all questions were answered prior to the procedure. Any pre-procedural antibiotics were given according to the performing physicians recommendation. Pt's information was confirmed on samples and samples were sent for analysis. Paient reviewed information on labels sent with patient and confirmed the accuracy of all the labels.    Consent read and signed: Yes   No Known Allergies  Performing Physician: Dr. hammond  Antibiotic taken?  yes  Aspirin or other blood thinning medications discontinued 7-10 days:  yes  Time of Fleet's enema:  12 pm   The following medication was given:     MEDICATION:  Lidocaine 1% Soln  ROUTE: local infiltration  SITE: prostate  DOSE: 10 ml  LOT #: [id number]  : Hosp[employer]  EXPIRATION DATE: 09012023  NDC#: 1635-8150-85   Was there drug waste? No  Multi-dose vial: No    Kierra Lopez CMA  March 2, 2023    12 samples were taken from the right and left base, mid, and apex of the prostate respectively. 2 additional samples were taken from target area. Vitals were repeated prior to patient leaving and instructions for post TRUS care were explained to the pt.

## 2023-03-02 NOTE — PROGRESS NOTES
PREPROCEDURE DIAGNOSIS: Elevated PSA   POSTPROCEDURE DIAGNOSIS: Elevated PSA  PROCEDURE: Transrectal ultrasound sizing and transrectal ultrasound guided prostatic needle biopsy, including MRI fusion targeting  for Dakota Myers who is a 62 year old male.   SURGEON: Shabbir Gerard MD   ANESTHESIA: 5 mL of 1% periprostatic block bilaterally   We previously obtained an MRI of the prostate and identified all PIRADS 3-5 lesions for targeting    DESCRIPTION OF PROCEDURE: The procedure, the outcome, the anesthesia, and the risks were discussed with the patient. Informed consent was obtained and signed and a timeout was completed prior to the procedure. Digital rectal examination was performed with the below findings noted. Anesthesia was administered as noted above and the transrectal ultrasound probe was inserted, sizing was performed, and the below findings were noted. 2 core biopsies were taken from each of the MRI targets, and 12 core biopsies were taken as described below. The probe was then withdrawn. Patient tolerated the procedure well.   FINDINGS: Digital rectal exam reveals normal prostate. US images were obtained and then fused with the MRI images.  The fused images were then used to guide the biopsy of the targeted lesions with 2 cores taken of each lesion.  We then performed random biopsies.  12 cores were taken with 6 on each side, base, mid and apex.      Shabbir Gerard MD   Department of Urology   [facility]

## 2023-03-02 NOTE — LETTER
3/2/2023       RE: Dakota Myers  [address] [facility] [address]     Dear Colleague,    Thank you for referring your patient, Dakota Myers, to the [facility] [place] at [facility]. Please see a copy of my visit note below.    PREPROCEDURE DIAGNOSIS: Elevated PSA   POSTPROCEDURE DIAGNOSIS: Elevated PSA  PROCEDURE: Transrectal ultrasound sizing and transrectal ultrasound guided prostatic needle biopsy, including MRI fusion targeting  for Dakota Myers who is a 62 year old male.   SURGEON: Shabbir Gerard MD   ANESTHESIA: 5 mL of 1% periprostatic block bilaterally   We previously obtained an MRI of the prostate and identified all PIRADS 3-5 lesions for targeting    DESCRIPTION OF PROCEDURE: The procedure, the outcome, the anesthesia, and the risks were discussed with the patient. Informed consent was obtained and signed and a timeout was completed prior to the procedure. Digital rectal examination was performed with the below findings noted. Anesthesia was administered as noted above and the transrectal ultrasound probe was inserted, sizing was performed, and the below findings were noted. 2 core biopsies were taken from each of the MRI targets, and 12 core biopsies were taken as described below. The probe was then withdrawn. Patient tolerated the procedure well.   FINDINGS: Digital rectal exam reveals normal prostate. US images were obtained and then fused with the MRI images.  The fused images were then used to guide the biopsy of the targeted lesions with 2 cores taken of each lesion.  We then performed random biopsies.  12 cores were taken with 6 on each side, base, mid and apex.      Shabbir Gerard MD   Department of Urology   [facility]

## 2023-03-02 NOTE — PATIENT INSTRUCTIONS
[facility]  Transrectal Ultrasound  Post Operative Information    The physician who performed your Transrectal Ultrasound is Dr. mcallister (telephone number [phone], 8-5 Monday thru Friday, [phone] after hours).  Please contact this doctor if you have any problems or questions.  If unable to reach your doctor, please return to the Emergency Department.    Take one antibiotic the evening of the procedure and then as directed on your prescription.  Drink at least 6-8 glasses of fluids for the first 48 hours.  Avoid heavy lifting and strenuous activity for 48 hours.  Avoid sexual intercourse for the first 24 hours.  No aspirin or ibuprofen products (Motrin, Advil, Nuprin, ect.) for one week.  You may take acetaminophen (Tylenol) for pain.  You may notice a small amount of blood on the tissue after a bowel movement.  You may pass blood with clots in your urine following the procedure.  The amount will decrease with time but may be visible for up to two weeks.   You make have blood in your semen for 4 weeks after the procedure.  You may experience mild perineal (groin area) discomfort after the procedure.  Please call you doctor if you have any of the follow symptoms:  Fever  Increase in the amount of blood passed  Severe discomfort or pain

## 2023-03-09 DIAGNOSIS — C61 PROSTATE CANCER (H): Primary | ICD-10-CM

## 2023-03-09 LAB
PATH REPORT.COMMENTS IMP SPEC: ABNORMAL
PATH REPORT.COMMENTS IMP SPEC: YES
PATH REPORT.FINAL DX SPEC: ABNORMAL
PATH REPORT.GROSS SPEC: ABNORMAL
PATH REPORT.MICROSCOPIC SPEC OTHER STN: ABNORMAL
PATH REPORT.RELEVANT HX SPEC: ABNORMAL
PHOTO IMAGE: ABNORMAL

## 2023-03-09 PROCEDURE — 99207 PR NO BILLABLE SERVICE THIS VISIT: CPT | Performed by: UROLOGY

## 2023-03-09 NOTE — PROGRESS NOTES
Phone visit duration 15 mins (due to pending pathology, initial appt was cancelled and I discussed the path with the patient over the phone)    Urology Clinic     HPI  Dakota Myers is a 62 year old male with newly diagnosed prostate cancer, here for follow-up after his prostate biopsy.      The patient denies any major issues after the biopsy     No changes to health, hospitalizations or new diagnoses in the interim    PHYSICAL EXAM    No vitals were obtained or physical exam was done today due to virtual visit.      Labs  Lab Results   Component Value Date    WBC 8.6 05/25/2014     Lab Results   Component Value Date    RBC 4.90 05/25/2014     Lab Results   Component Value Date    HGB 15.6 04/14/2015     Lab Results   Component Value Date    HCT 45.9 05/25/2014     No components found for: MCT  Lab Results   Component Value Date    MCV 94 05/25/2014     Lab Results   Component Value Date    MCH 34.5 05/25/2014     Lab Results   Component Value Date    MCHC 36.8 05/25/2014     Lab Results   Component Value Date    RDW 13.5 05/25/2014     Lab Results   Component Value Date     05/25/2014        Last Comprehensive Metabolic Panel:  Sodium   Date Value Ref Range Status   12/29/2022 141 136 - 145 mmol/L Final   06/07/2017 137 133 - 144 mmol/L Final     Potassium   Date Value Ref Range Status   12/29/2022 4.0 3.4 - 5.3 mmol/L Final   06/07/2017 4.2 3.4 - 5.3 mmol/L Final     Chloride   Date Value Ref Range Status   12/29/2022 103 98 - 107 mmol/L Final   06/07/2017 101 94 - 109 mmol/L Final     Carbon Dioxide   Date Value Ref Range Status   06/07/2017 22 20 - 32 mmol/L Final     Carbon Dioxide (CO2)   Date Value Ref Range Status   12/29/2022 25 22 - 29 mmol/L Final     Anion Gap   Date Value Ref Range Status   12/29/2022 13 7 - 15 mmol/L Final   06/07/2017 14 3 - 14 mmol/L Final     Glucose   Date Value Ref Range Status   12/29/2022 92 70 - 99 mg/dL Final   06/07/2017 86 70 - 99 mg/dL Final     Comment:     Non  Fasting     Urea Nitrogen   Date Value Ref Range Status   12/29/2022 18.4 8.0 - 23.0 mg/dL Final   06/07/2017 16 7 - 30 mg/dL Final     Creatinine   Date Value Ref Range Status   12/29/2022 1.18 (H) 0.67 - 1.17 mg/dL Final   06/07/2017 1.17 0.66 - 1.25 mg/dL Final     GFR Estimate   Date Value Ref Range Status   12/29/2022 70 >60 mL/min/1.73m2 Final     Comment:     Effective December 21, 2021 eGFRcr in adults is calculated using the 2021 CKD-EPI creatinine equation which includes age and gender (Esperanza et al., NEJ, DOI: 10.1056/PFKNcy1578533)   06/07/2017 64 >60 mL/min/1.7m2 Final     Comment:     Non  GFR Calc     Calcium   Date Value Ref Range Status   12/29/2022 9.8 8.8 - 10.2 mg/dL Final   06/07/2017 9.3 8.5 - 10.1 mg/dL Final     Bilirubin Total   Date Value Ref Range Status   05/25/2014 0.8 0.2 - 1.3 mg/dL Final     Alkaline Phosphatase   Date Value Ref Range Status   05/25/2014 88 40 - 150 U/L Final     ALT   Date Value Ref Range Status   05/25/2014 27 0 - 70 U/L Final     AST   Date Value Ref Range Status   05/25/2014 22 0 - 45 U/L Final       Prostate Specific Antigen Screen   Date Value Ref Range Status   12/29/2022 21.90 (H) 0.00 - 4.50 ng/mL Final        Surgical pathology    Final Diagnosis  A.  Prostate, left base x2, needle biopsy:  -Prostatic adenocarcinoma, acinar-type.  Grade group-1 (Luz Elena score 3+ 3 = 6). Tumor involves 1 of 2 cores.  Percentage of prostatic tissue involved by tumor: 5%.       B.  Prostate, left mid x2, needle biopsy:  -Prostatic adenocarcinoma, acinar-type.  Grade group-2 (Luz Elena score 3+ 4 = 7). Tumor involves 1 of 2 cores.  Percentage of pattern 4:  5-10%.   Percentage of prostatic tissue involved by tumor: 25%.  Focal perineural invasion.  High-grade prostatic intraepithelial neoplasia (PIN).     C.  Prostate, left apex x2, needle biopsy:  -Prostatic adenocarcinoma, acinar-type.  Grade group-2 (Luz Elena score 3+ 4= 7). Tumor involves 2 of 2 cores.   Percentage of pattern 4:  10-20%.  Percentage of prostatic tissue involved by tumor: 70%.  High-grade PIN.     D.  Prostate, right base x2, needle biopsy:  -Prostatic adenocarcinoma, acinar-type.  Grade group-2 (Luz Elena score 3+ 4 = 7). Tumor involves 2 of 2 cores.  Percentage of pattern 4:  20-30%.  Percentage of prostatic tissue involved by tumor: 75%.  High-grade PIN.     E.  Prostate, right mid x2, needle biopsy:  -Prostatic adenocarcinoma, acinar-type.  Grade group-2 (Luz Elena score 3+ 4 = 7).  Tumor involves 2 of 2 cores.  Percentage of pattern 4:  40%.  Percentage of prostatic tissue involved by tumor: 75%.  High-grade PIN.  -See COMMENT.     F.  Prostate, right apex x2, needle biopsy:  -Prostatic adenocarcinoma, acinar-type.  Grade group-3 (Junedale score 4+ 3= 7). Tumor involves 2 of 2 cores.  Percentage of pattern 4:  50-60%.  Percentage of prostatic tissue involved by tumor: 60%.  High-grade PIN.     G.  Prostate, target #1 x2, needle biopsy:  -Prostatic adenocarcinoma, acinar-type.  Grade group-3 (Junedale score 4+ 3= 7). Tumor involves 2 of 2 cores.  Percentage of pattern 4:  50-60%.  Percentage of prostatic tissue involved by tumor: 90%.  High-grade PIN.      Imaging   MR prostate 1/31/2023     FINDINGS:  Size: 6.9 x 5.5 x 4.6 cm, 91.4 grams  Hemorrhage: Absent  Peripheral zone: Homogeneously hyperintense on T2-weighted images.  Suspicious lesions as detailed below.  Transition zone: Enlarged with BPH changes. No suspicious lesions  identified.     Lesion(s) in rank order of severity (highest score- to lowest score,  then by size)      Lesion 1:  Location: Bilateral, predominantly right apex and mid gland peripheral  zone at the 5 to 8 o'clock position relative to the urethra. Series 11  image 17.   Additional prostate regions involved: None  Size: 32 x 11 x 27 mm  T2 description: Hypointense smudged charcoal appearance.  T2 numerical assessment: 5  DWI description: Bright restricted diffusion. Dark on  ADC.  DWI numerical assessment: 5  DCE assessment: Positive    Prostate margin: Capsular abutment>15 mm with focal bulging and  capular irregularity   Lesion overall PI-RADS category: 5     Neurovascular bundles: No neurovascular bundle involvement by  malignancy.  Seminal vesicles: No seminal vesicle involvement by malignancy.  Lymph nodes: No lymph node involvement  Bones: No suspicious lesions  Other pelvic organs: No additional findings.                                                                         IMPRESSION:  1. Based on the most suspicious abnormality, this exam is  characterized as PIRADS 5 - Clinically significant cancer is highly  likely to be present.  The most suspicious abnormality is located at  the apex and base peripheral zone at the 5 to 8 o'clock position and  there is high suspicion of extraprostatic extension.  2. No suspicious adenopathy or evidence of pelvic metastases.    ASSESSMENT AND PLAN  62 year old male with unfavorable intemediate risk prostate cancer     We had an extensive discussion about the significance of localized, prostate cancer. We discussed the options for treatment of a localized prostate cancer including active surveillance, brachytherapy, external beam radiation therapy, and surgical removal.      We discussed the relative merits of robotic assisted radical prostatectomy. We also discussed the advantages and disadvantages and roles of open surgery vs. laparoscopic (and Da Yolette assisted) surgery. The anticipated post-operative course was explained, including an anticipated 1-2 day hospital stay. Catheter will remain in place 10-14 days.  The risks, benefits, alternatives, and personnel involved in the procedure were discussed. Specifically, we discussed that risks include but are not limited to anesthetic complications including stroke, MI, DVT/PE, as well as risk of bleeding, bowel injury, infection, lymphocele, urine leak and other potentially unforseen  complications. Also discussed the risk of bladder neck contracture and other urinary symptoms after procedure. In addition, we discussed risk of urinary incontinence and erectile dysfunction following the procedure. Finally, we discussed risk for adverse pathologic features, including positive surgical margins and potential for post-surgical radiation, hormone ablation and long-term need for PSA monitoring,.  All questions were answered in detail.     We discussed that there was no clear evidence of advantage between surgery and radiation with regard to risk of recurrence. We discussed option for discussion with radiation oncology, but patient would prefer to defer this at this time.    We also discussed the need for a bone scan to complete the staging     Plan   Obtain bone scan   Call the patient with the results to readdress the treatment options     30 total minutes spent on the date of the encounter including direct interaction with the patient, performing chart review, documentation and further activities as noted above    Shabbir Gerard MD   Department of Urology   [facility]

## 2023-03-13 ENCOUNTER — MYC MEDICAL ADVICE (OUTPATIENT)
Dept: FAMILY MEDICINE | Facility: CLINIC | Age: 63
End: 2023-03-13

## 2023-03-13 ENCOUNTER — TELEPHONE (OUTPATIENT)
Dept: UROLOGY | Facility: CLINIC | Age: 63
End: 2023-03-13

## 2023-03-13 ENCOUNTER — PATIENT OUTREACH (OUTPATIENT)
Dept: UROLOGY | Facility: CLINIC | Age: 63
End: 2023-03-13

## 2023-03-13 DIAGNOSIS — R97.20 ELEVATED PROSTATE SPECIFIC ANTIGEN (PSA): Primary | ICD-10-CM

## 2023-03-13 NOTE — TELEPHONE ENCOUNTER
[facility] Call Center    Phone Message    May a detailed message be left on voicemail: yes     Reason for Call: Patient is calling because he is supposed to have more imaging done but the Imaging Dept told him that this imaging order is done in clinic. Please review and reach out to patient. Thank you    Action Taken: Message routed to:  [facility] URO    Travel Screening: Not Applicable

## 2023-03-13 NOTE — TELEPHONE ENCOUNTER
Writer reached out to pt to inform him of his scheduled CT and bone scan.     Pt agreeable to date and time of appointment.     Pt stated that hes been having extreme anxiety since the diagnosis last Thursday. Pt requesting medication from provider.     Writer informed pt that provider will be made aware of concern during clinic on Wednesday. Pt expressed understanding     Will continue to follow as needed.

## 2023-03-13 NOTE — TELEPHONE ENCOUNTER
Dakota is calling again asking about scheduling this order. Writer told him turn around times for call backs can take up to 24 hours. Writer stated that they are unsure who is supposed to schedule this order and what exactly it is for, if imaging is unable to schedule it then the clinic needs to be able to review it and call you back. Dakota then hung up on writer. Please call back, thanks.

## 2023-03-16 ENCOUNTER — E-VISIT (OUTPATIENT)
Dept: FAMILY MEDICINE | Facility: CLINIC | Age: 63
End: 2023-03-16
Payer: COMMERCIAL

## 2023-03-16 DIAGNOSIS — F43.22 ACUTE ADJUSTMENT DISORDER WITH ANXIETY: Primary | ICD-10-CM

## 2023-03-16 PROCEDURE — 99422 OL DIG E/M SVC 11-20 MIN: CPT | Performed by: FAMILY MEDICINE

## 2023-03-17 RX ORDER — DIAZEPAM 2 MG
2 TABLET ORAL EVERY 12 HOURS PRN
Qty: 10 TABLET | Refills: 0 | Status: SHIPPED | OUTPATIENT
Start: 2023-03-17 | End: 2023-04-25

## 2023-03-17 NOTE — TELEPHONE ENCOUNTER
Provider E-Visit time total (minutes): 13    Called patient to discuss with him.    He has used valium before without issue.    Review of [place] Prescription Monitoring Program (): Today I have also reviewed the patient's history of controlled substance use, as provided by [place] licensed pharmacies and prescriber dispensers.  No unusual or outside prescriptions.    I do think it's appropriate given new serious diagnosis and appropriate anxiety/worry/grief.    Sent to pharmacy, #10 with no refills.    Will follow along with urology notes and results.    Touch base again once he knows plan for treatment.  We're here to help with anything he needs.    Dr. Jagruti Ahumada MD / [facility]

## 2023-03-22 ENCOUNTER — HOSPITAL ENCOUNTER (OUTPATIENT)
Dept: NUCLEAR MEDICINE | Facility: CLINIC | Age: 63
Setting detail: NUCLEAR MEDICINE
Discharge: HOME | End: 2023-03-22
Attending: UROLOGY
Payer: COMMERCIAL

## 2023-03-22 ENCOUNTER — HOSPITAL ENCOUNTER (OUTPATIENT)
Dept: CT IMAGING | Facility: CLINIC | Age: 63
Discharge: HOME | End: 2023-03-22
Attending: UROLOGY
Payer: COMMERCIAL

## 2023-03-22 PROCEDURE — 74177 CT ABD & PELVIS W/CONTRAST: CPT

## 2023-03-22 PROCEDURE — 74177 CT ABD & PELVIS W/CONTRAST: CPT | Mod: 26 | Performed by: RADIOLOGY

## 2023-03-22 PROCEDURE — 78306 BONE IMAGING WHOLE BODY: CPT | Mod: 26

## 2023-03-22 PROCEDURE — A9503 TC99M MEDRONATE: HCPCS | Performed by: UROLOGY

## 2023-03-22 PROCEDURE — 250N000011 HC RX IP 250 OP 636: Performed by: UROLOGY

## 2023-03-22 PROCEDURE — 343N000001 HC RX 343: Performed by: UROLOGY

## 2023-03-22 PROCEDURE — 78306 BONE IMAGING WHOLE BODY: CPT

## 2023-03-22 PROCEDURE — 78830 RP LOCLZJ TUM SPECT W/CT 1: CPT

## 2023-03-22 RX ORDER — TC 99M MEDRONATE 20 MG/10ML
20-30 INJECTION, POWDER, LYOPHILIZED, FOR SOLUTION INTRAVENOUS ONCE
Status: COMPLETED | OUTPATIENT
Start: 2023-03-22 | End: 2023-03-22

## 2023-03-22 RX ORDER — IOPAMIDOL 755 MG/ML
126 INJECTION, SOLUTION INTRAVASCULAR ONCE
Status: COMPLETED | OUTPATIENT
Start: 2023-03-22 | End: 2023-03-22

## 2023-03-22 RX ADMIN — TC 99M MEDRONATE 25.9 MILLICURIE: 20 INJECTION, POWDER, LYOPHILIZED, FOR SOLUTION INTRAVENOUS at 10:09

## 2023-03-22 RX ADMIN — IOPAMIDOL 126 ML: 755 INJECTION, SOLUTION INTRAVENOUS at 10:35

## 2023-03-28 ENCOUNTER — TELEPHONE (OUTPATIENT)
Dept: UROLOGY | Facility: CLINIC | Age: 63
End: 2023-03-28

## 2023-03-28 ENCOUNTER — PATIENT OUTREACH (OUTPATIENT)
Dept: UROLOGY | Facility: CLINIC | Age: 63
End: 2023-03-28

## 2023-03-28 NOTE — TELEPHONE ENCOUNTER
[facility] Call Center    Phone Message    May a detailed message be left on voicemail: yes     Reason for Call: Patient called to see about his prostate removal surgery. Writer isn't seeing orders placed from Dr. Gerard yet. Please check into this and advise patient. Thank you.    Action Taken: Message routed to:  [facility] Urology    Travel Screening: Not Applicable

## 2023-03-28 NOTE — TELEPHONE ENCOUNTER
Writer reached out to pt to inform him that results for his imaging are viewable.     Writer will speak with provider tomorrow in clinic to get a follow up plan.     Writer will reach out late in the day Wednesday or Thursday morning. Pt agreeable to plan.     Will continue to follow as needed.

## 2023-03-29 DIAGNOSIS — C61 PROSTATE CANCER (H): Primary | ICD-10-CM

## 2023-03-29 RX ORDER — ACETAMINOPHEN 325 MG/1
975 TABLET ORAL ONCE
Status: CANCELLED | OUTPATIENT
Start: 2023-03-29 | End: 2023-03-29

## 2023-03-29 RX ORDER — GABAPENTIN 100 MG/1
300 CAPSULE ORAL
Status: CANCELLED | OUTPATIENT
Start: 2023-03-29

## 2023-03-29 RX ORDER — HEPARIN SODIUM 10000 [USP'U]/ML
5000 INJECTION, SOLUTION INTRAVENOUS; SUBCUTANEOUS
Status: CANCELLED | OUTPATIENT
Start: 2023-03-29

## 2023-04-03 ENCOUNTER — TELEPHONE (OUTPATIENT)
Dept: UROLOGY | Facility: CLINIC | Age: 63
End: 2023-04-03

## 2023-04-03 NOTE — TELEPHONE ENCOUNTER
[facility] Call Center    Phone Message    May a detailed message be left on voicemail: yes     Reason for Call: Other: .  Pt calling to schedule surgery with Moustapha. Please call pt     Action Taken: Message routed to:  Other: Uro    Travel Screening: Not Applicable

## 2023-04-18 ENCOUNTER — TELEPHONE (OUTPATIENT)
Dept: UROLOGY | Facility: CLINIC | Age: 63
End: 2023-04-18

## 2023-04-18 DIAGNOSIS — C61 PROSTATE CANCER (H): Primary | ICD-10-CM

## 2023-04-18 DIAGNOSIS — N39.0 UTI (URINARY TRACT INFECTION): ICD-10-CM

## 2023-04-18 NOTE — TELEPHONE ENCOUNTER
Called patient to schedule surgery with Dr. Gerard. Patient was contacted regarding rescheduling date due to providers schedule.    Offered to schedule patient for first available of 5/9/2023. Pt agreeable. Patient wanting to know when he will be discharged from the hospital. Informed patient for overnight stay, and discharge will depend on the hospital.      Patient had not scheduled a pre-op. Informed patient that per Dr. Gerard, would like to schedule a PAC visit. Patient elected for virtual visit with PAC prior to new surgery date. Scheduled for 4/25/2023 as patient is currently in [place] until Friday, 4/21/2023. Pt knows PAC will provide with arrival times and instructions.     Location of surgery: [place]    Post op: Needs a 10 day post op appointment in person with Dr. Gerard.     States he was sent a packet in the mail already from [facility] . Will not send another packet to patient.       Flory Ahumada on 4/18/2023 at 1:43 PM

## 2023-04-18 NOTE — TELEPHONE ENCOUNTER
----- Message from Lana Baxter sent at 4/18/2023  8:28 AM CDT -----  This pt would prefer to be done over there, he states its way more convenient for him.

## 2023-04-19 ENCOUNTER — PATIENT OUTREACH (OUTPATIENT)
Dept: UROLOGY | Facility: CLINIC | Age: 63
End: 2023-04-19

## 2023-04-19 NOTE — TELEPHONE ENCOUNTER
Writer reached out to pt to assist with scheduling his labs for pre op.     No answer, left generic VM with call back name and number.       Will continue to follow as needed.

## 2023-04-19 NOTE — TELEPHONE ENCOUNTER
FUTURE VISIT INFORMATION      SURGERY INFORMATION:    Date: 23    Location: uu or    Surgeon:  Shabbir Gerard MD    Anesthesia Type:  General    Procedure: ROBOTIC ASSISTED LAPAROSCOPIC RADICAL PROSTATECTOMY BILATERAL PELVIC LYMPHADENECTOMY possible open PROSTATECTOMY, WITH LYMPHADENECTOMY    RECORDS REQUESTED FROM:       Primary Care Provider: Jagruti Ahumada MD- [facility]    Most recent EKG+ [id number]

## 2023-04-19 NOTE — TELEPHONE ENCOUNTER
Pt returned writers call regarding the need for labs prior to surgery.     Writer assisted in scheduling appointment for 5/2. Pt agreeable to date and time.     Will continue to follow as needed.

## 2023-04-25 ENCOUNTER — VIRTUAL VISIT (OUTPATIENT)
Dept: SURGERY | Facility: CLINIC | Age: 63
End: 2023-04-25
Payer: COMMERCIAL

## 2023-04-25 ENCOUNTER — ANESTHESIA EVENT (OUTPATIENT)
Dept: SURGERY | Facility: CLINIC | Age: 63
End: 2023-04-25
Payer: COMMERCIAL

## 2023-04-25 ENCOUNTER — PRE VISIT (OUTPATIENT)
Dept: SURGERY | Facility: CLINIC | Age: 63
End: 2023-04-25

## 2023-04-25 DIAGNOSIS — C61 PROSTATE CANCER (H): ICD-10-CM

## 2023-04-25 DIAGNOSIS — Z01.818 PREOP EXAMINATION: Primary | ICD-10-CM

## 2023-04-25 PROCEDURE — 99203 OFFICE O/P NEW LOW 30 MIN: CPT | Mod: VID | Performed by: PHYSICIAN ASSISTANT

## 2023-04-25 RX ORDER — MAG HYDROX/ALUMINUM HYD/SIMETH 400-400-40
450 SUSPENSION, ORAL (FINAL DOSE FORM) ORAL 2 TIMES DAILY
COMMUNITY
End: 2023-04-25

## 2023-04-25 ASSESSMENT — LIFESTYLE VARIABLES: TOBACCO_USE: 1

## 2023-04-25 NOTE — PROGRESS NOTES
Dakota is a 62 year old who is being evaluated via a billable video visit.      How would you like to obtain your AVS? MyChart      Subjective   Dakota is a 62 year old, presenting for the following health issues:  Pre-Op Exam (/)    HPI           Review of Systems       Physical Exam           PADMINI Scott LPN
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
## 2023-04-25 NOTE — PATIENT INSTRUCTIONS
Preparing for Your Surgery      Name:  Dakota Myers   MRN:  [id number]   :  1960   Today's Date:  2023       Arriving for surgery:  Surgery date:  2023  Arrival time:  12:00 pm    [facility] [address]  [address] [place]  [address]    - ? parking is available in front of the hospital      -    Please proceed to [address] on the 3rd floor. [phone]?     - ?If you are in need of directions, wheelchair or escort please stop at the Information Desk in the lobby.      What can I eat or drink?  -  You may eat and drink normally up to 8 hours prior to arrival time. (Until 4 am)  -  You may have clear liquids until 2 hours prior to arrival time. (Until 10 am)    Examples of clear liquids:  Water  Clear broth  Juices (apple, white grape, white cranberry  and cider) without pulp  Noncarbonated, powder based beverages  (lemonade and Jeff-Aid)  Sodas (Sprite, 7-Up, ginger ale and seltzer)  Coffee or tea (without milk or cream)  Gatorade    -  No Alcohol for at least 24 hours before surgery.     Which medicines can I take?    Hold Aspirin for 7 days before surgery.   Hold Multivitamins for 7 days before surgery.  Hold Supplements for 7 days before surgery. (Saw Salvisa-Pumpkin Seed Oil)  Hold Ibuprofen (Advil, Motrin) for 1 day(s) before surgery--unless otherwise directed by surgeon.  Hold Naproxen (Aleve) for 4 days before surgery.      -  PLEASE TAKE these medications the day of surgery:  Acetaminophen (Tylenol) if needed       How do I prepare myself?  - Please take 2 showers (one the night prior to surgery and one the morning of surgery) using Scrubcare or Hibiclens soap.    Use this soap only from the neck to your toes.     Leave the soap on your skin for one minute--then rinse thoroughly.      You may use your own shampoo and conditioner. No other hair products.   - Please remove all jewelry and body  piercings.  - No lotions, deodorants or fragrance.  - No makeup or fingernail polish.   - Bring your ID and insurance card.    -If you have a Deep Brain Stimulator, Spinal Cord Stimulator, or any Neuro Stimulator device---you must bring the remote control to the hospital.        Covid testing policy as of 12/06/2022  Your surgeon will notify and schedule you for a COVID test if one is needed before surgery--please direct any questions or COVID symptoms to your surgeon      Questions or Concerns:    - For any questions regarding the day of surgery or your hospital stay, please contact the Pre Admission Nursing Office at [phone].       - If you have health changes between today and your surgery, please call your surgeon.       - For questions after surgery, please call your surgeons office.           Current Visitor Guidelines       Surgeries and procedures: Adult patients can have 2 visitors all through the surgery process.     Visiting hours: 8 a.m. to 8:30 p.m.     Hospital: Adult patients and children under age 18 can have 4 visitor at a time       No visitors under the age of 5 are allowed for hospital patients.       Double occupancy rooms: Patients can have only two visitors at a time.       Patients confirmed or suspected to have symptoms of COVID 19 or flu:     No visitors allowed for adult patients.   Children (under age 18) can have 1 named visitor.     People who are sick or showing symptoms of COVID 19 or flu:    Are not allowed to visit patients--we can only make exceptions in special situations.       Please follow these guidelines for your visit:          Please maintain social distance          Masking is optional--however at times you may be asked to wear a mask for the safety of yourself and others     Clean your hands with alcohol hand . Do this when you arrive at and leave the building and patient room,    And again after you touch your mask or anything in the room.     Go directly  to and from the room you are visiting.     Stay in the patient s room during your visit. Limit going to other places in the hospital as much as possible     Leave bags and jackets at home or in the car.     For everyone s health, please don t come and go during your visit. That includes for smoking   during your visit.

## 2023-05-02 ENCOUNTER — APPOINTMENT (OUTPATIENT)
Dept: LAB | Facility: CLINIC | Age: 63
End: 2023-05-02

## 2023-05-02 ENCOUNTER — MYC MEDICAL ADVICE (OUTPATIENT)
Dept: FAMILY MEDICINE | Facility: CLINIC | Age: 63
End: 2023-05-02

## 2023-05-02 DIAGNOSIS — F43.22 ACUTE ADJUSTMENT DISORDER WITH ANXIETY: ICD-10-CM

## 2023-05-02 LAB
ALBUMIN SERPL BCG-MCNC: 4.2 G/DL (ref 3.5–5.2)
ALBUMIN UR-MCNC: NEGATIVE MG/DL
ALP SERPL-CCNC: 68 U/L (ref 40–129)
ALT SERPL W P-5'-P-CCNC: 16 U/L (ref 10–50)
ANION GAP SERPL CALCULATED.3IONS-SCNC: 10 MMOL/L (ref 7–15)
APPEARANCE UR: CLEAR
AST SERPL W P-5'-P-CCNC: 13 U/L (ref 10–50)
BACTERIA #/AREA URNS HPF: ABNORMAL /HPF
BASOPHILS # BLD AUTO: 0.1 10E3/UL (ref 0–0.2)
BASOPHILS NFR BLD AUTO: 1 %
BILIRUB SERPL-MCNC: 0.7 MG/DL
BILIRUB UR QL STRIP: NEGATIVE
BUN SERPL-MCNC: 13.2 MG/DL (ref 8–23)
CALCIUM SERPL-MCNC: 9.6 MG/DL (ref 8.8–10.2)
CHLORIDE SERPL-SCNC: 102 MMOL/L (ref 98–107)
COLOR UR AUTO: ABNORMAL
CREAT SERPL-MCNC: 1.07 MG/DL (ref 0.67–1.17)
DEPRECATED HCO3 PLAS-SCNC: 27 MMOL/L (ref 22–29)
EOSINOPHIL # BLD AUTO: 0 10E3/UL (ref 0–0.7)
EOSINOPHIL NFR BLD AUTO: 0 %
ERYTHROCYTE [DISTWIDTH] IN BLOOD BY AUTOMATED COUNT: 12.6 % (ref 10–15)
GFR SERPL CREATININE-BSD FRML MDRD: 78 ML/MIN/1.73M2
GLUCOSE SERPL-MCNC: 93 MG/DL (ref 70–99)
GLUCOSE UR STRIP-MCNC: NEGATIVE MG/DL
HCT VFR BLD AUTO: 43.9 % (ref 40–53)
HGB BLD-MCNC: 15 G/DL (ref 13.3–17.7)
HGB UR QL STRIP: NEGATIVE
IMM GRANULOCYTES # BLD: 0 10E3/UL
IMM GRANULOCYTES NFR BLD: 0 %
KETONES UR STRIP-MCNC: NEGATIVE MG/DL
LEUKOCYTE ESTERASE UR QL STRIP: ABNORMAL
LYMPHOCYTES # BLD AUTO: 1.7 10E3/UL (ref 0.8–5.3)
LYMPHOCYTES NFR BLD AUTO: 21 %
MCH RBC QN AUTO: 31.1 PG (ref 26.5–33)
MCHC RBC AUTO-ENTMCNC: 34.2 G/DL (ref 31.5–36.5)
MCV RBC AUTO: 91 FL (ref 78–100)
MONOCYTES # BLD AUTO: 0.5 10E3/UL (ref 0–1.3)
MONOCYTES NFR BLD AUTO: 7 %
NEUTROPHILS # BLD AUTO: 5.8 10E3/UL (ref 1.6–8.3)
NEUTROPHILS NFR BLD AUTO: 71 %
NITRATE UR QL: NEGATIVE
NRBC # BLD AUTO: 0 10E3/UL
NRBC BLD AUTO-RTO: 0 /100
PH UR STRIP: 5.5 [PH] (ref 5–7)
PLATELET # BLD AUTO: 307 10E3/UL (ref 150–450)
POTASSIUM SERPL-SCNC: 4.1 MMOL/L (ref 3.4–5.3)
PROT SERPL-MCNC: 6.9 G/DL (ref 6.4–8.3)
RBC # BLD AUTO: 4.82 10E6/UL (ref 4.4–5.9)
RBC URINE: 3 /HPF
SODIUM SERPL-SCNC: 139 MMOL/L (ref 136–145)
SP GR UR STRIP: 1.02 (ref 1–1.03)
TRANSITIONAL EPI: <1 /HPF
UROBILINOGEN UR STRIP-MCNC: NORMAL MG/DL
WBC # BLD AUTO: 8.1 10E3/UL (ref 4–11)
WBC CLUMPS #/AREA URNS HPF: PRESENT /HPF
WBC URINE: 47 /HPF

## 2023-05-02 PROCEDURE — 87088 URINE BACTERIA CULTURE: CPT | Mod: 90 | Performed by: PATHOLOGY

## 2023-05-02 PROCEDURE — 80053 COMPREHEN METABOLIC PANEL: CPT | Performed by: PATHOLOGY

## 2023-05-02 PROCEDURE — 87186 SC STD MICRODIL/AGAR DIL: CPT | Mod: 90 | Performed by: PATHOLOGY

## 2023-05-02 PROCEDURE — 36415 COLL VENOUS BLD VENIPUNCTURE: CPT | Performed by: PATHOLOGY

## 2023-05-02 PROCEDURE — 81001 URINALYSIS AUTO W/SCOPE: CPT | Performed by: PATHOLOGY

## 2023-05-02 PROCEDURE — 99000 SPECIMEN HANDLING OFFICE-LAB: CPT | Performed by: PATHOLOGY

## 2023-05-02 PROCEDURE — 85025 COMPLETE CBC W/AUTO DIFF WBC: CPT | Performed by: PATHOLOGY

## 2023-05-02 PROCEDURE — 87086 URINE CULTURE/COLONY COUNT: CPT | Mod: 90 | Performed by: PATHOLOGY

## 2023-05-04 RX ORDER — DIAZEPAM 2 MG
2 TABLET ORAL EVERY 12 HOURS PRN
Qty: 10 TABLET | Refills: 0 | Status: SHIPPED | OUTPATIENT
Start: 2023-05-04 | End: 2023-07-18

## 2023-05-04 NOTE — TELEPHONE ENCOUNTER
Dr. Ahumaad: please see pt message; rx pended.    Dr. Ahumada,  I made it through all the tests ok, thank you so much for the anti-anxiety Rx, it helped a lot to just tone down my anxiety and help me function normally. My radical prostectomy is next Tuesday and I'm finding my anxiety levels really increasing as this gets closer.      Just wondering if it might be possible to get a refill? I think that would get me through this next stage.     Raisa FONTAINE RN  [facility]

## 2023-05-05 ENCOUNTER — PATIENT OUTREACH (OUTPATIENT)
Dept: UROLOGY | Facility: CLINIC | Age: 63
End: 2023-05-05

## 2023-05-05 DIAGNOSIS — N39.0 UTI (URINARY TRACT INFECTION): Primary | ICD-10-CM

## 2023-05-05 LAB
BACTERIA UR CULT: ABNORMAL
BACTERIA UR CULT: ABNORMAL

## 2023-05-05 RX ORDER — NITROFURANTOIN 25; 75 MG/1; MG/1
100 CAPSULE ORAL 2 TIMES DAILY
Qty: 10 CAPSULE | Refills: 0 | Status: ON HOLD | OUTPATIENT
Start: 2023-05-05 | End: 2023-05-10

## 2023-05-05 NOTE — TELEPHONE ENCOUNTER
Writer reached out to pt to inform him of the need of antibiotics.     Pt confirmed preferred pharmacy.     Writer reviewed medications, eating and drinking, recovery, length of surgery, etc. All questions answered.     Will continue to follow as needed.

## 2023-05-09 ENCOUNTER — ANESTHESIA (OUTPATIENT)
Dept: SURGERY | Facility: CLINIC | Age: 63
End: 2023-05-09
Payer: COMMERCIAL

## 2023-05-09 ENCOUNTER — HOSPITAL ENCOUNTER (OUTPATIENT)
Facility: CLINIC | Age: 63
Discharge: HOME | End: 2023-05-10
Attending: UROLOGY | Admitting: UROLOGY
Payer: COMMERCIAL

## 2023-05-09 DIAGNOSIS — C61 PROSTATE CANCER (H): Primary | ICD-10-CM

## 2023-05-09 LAB
ABO/RH(D): NORMAL
ANTIBODY SCREEN: NEGATIVE
HGB BLD-MCNC: 13.9 G/DL (ref 13.3–17.7)
HOLD SPECIMEN: NORMAL
SPECIMEN EXPIRATION DATE: NORMAL

## 2023-05-09 PROCEDURE — 82962 GLUCOSE BLOOD TEST: CPT

## 2023-05-09 PROCEDURE — 999N000141 HC STATISTIC PRE-PROCEDURE NURSING ASSESSMENT: Performed by: UROLOGY

## 2023-05-09 PROCEDURE — 258N000003 HC RX IP 258 OP 636: Performed by: ANESTHESIOLOGY

## 2023-05-09 PROCEDURE — 88304 TISSUE EXAM BY PATHOLOGIST: CPT | Mod: 26 | Performed by: PATHOLOGY

## 2023-05-09 PROCEDURE — 250N000011 HC RX IP 250 OP 636: Performed by: ANESTHESIOLOGY

## 2023-05-09 PROCEDURE — 250N000011 HC RX IP 250 OP 636: Performed by: UROLOGY

## 2023-05-09 PROCEDURE — 250N000013 HC RX MED GY IP 250 OP 250 PS 637: Performed by: UROLOGY

## 2023-05-09 PROCEDURE — 250N000011 HC RX IP 250 OP 636: Performed by: STUDENT IN AN ORGANIZED HEALTH CARE EDUCATION/TRAINING PROGRAM

## 2023-05-09 PROCEDURE — 88305 TISSUE EXAM BY PATHOLOGIST: CPT | Mod: TC | Performed by: UROLOGY

## 2023-05-09 PROCEDURE — 250N000011 HC RX IP 250 OP 636: Performed by: NURSE ANESTHETIST, CERTIFIED REGISTERED

## 2023-05-09 PROCEDURE — 250N000013 HC RX MED GY IP 250 OP 250 PS 637: Performed by: STUDENT IN AN ORGANIZED HEALTH CARE EDUCATION/TRAINING PROGRAM

## 2023-05-09 PROCEDURE — 36415 COLL VENOUS BLD VENIPUNCTURE: CPT | Performed by: STUDENT IN AN ORGANIZED HEALTH CARE EDUCATION/TRAINING PROGRAM

## 2023-05-09 PROCEDURE — 85018 HEMOGLOBIN: CPT | Performed by: STUDENT IN AN ORGANIZED HEALTH CARE EDUCATION/TRAINING PROGRAM

## 2023-05-09 PROCEDURE — 88309 TISSUE EXAM BY PATHOLOGIST: CPT | Mod: 26 | Performed by: PATHOLOGY

## 2023-05-09 PROCEDURE — 360N000080 HC SURGERY LEVEL 7, PER MIN: Performed by: UROLOGY

## 2023-05-09 PROCEDURE — 370N000017 HC ANESTHESIA TECHNICAL FEE, PER MIN: Performed by: UROLOGY

## 2023-05-09 PROCEDURE — 258N000003 HC RX IP 258 OP 636: Performed by: STUDENT IN AN ORGANIZED HEALTH CARE EDUCATION/TRAINING PROGRAM

## 2023-05-09 PROCEDURE — 36415 COLL VENOUS BLD VENIPUNCTURE: CPT | Performed by: UROLOGY

## 2023-05-09 PROCEDURE — 250N000009 HC RX 250: Performed by: STUDENT IN AN ORGANIZED HEALTH CARE EDUCATION/TRAINING PROGRAM

## 2023-05-09 PROCEDURE — 272N000001 HC OR GENERAL SUPPLY STERILE: Performed by: UROLOGY

## 2023-05-09 PROCEDURE — 258N000003 HC RX IP 258 OP 636: Performed by: NURSE ANESTHETIST, CERTIFIED REGISTERED

## 2023-05-09 PROCEDURE — 88307 TISSUE EXAM BY PATHOLOGIST: CPT | Mod: TC | Performed by: UROLOGY

## 2023-05-09 PROCEDURE — 88307 TISSUE EXAM BY PATHOLOGIST: CPT | Mod: 26 | Performed by: PATHOLOGY

## 2023-05-09 PROCEDURE — 250N000025 HC SEVOFLURANE, PER MIN: Performed by: UROLOGY

## 2023-05-09 PROCEDURE — 250N000011 HC RX IP 250 OP 636

## 2023-05-09 PROCEDURE — 38571 LAPAROSCOPY LYMPHADENECTOMY: CPT | Mod: GC | Performed by: UROLOGY

## 2023-05-09 PROCEDURE — 55866 LAPS SURG PRST8ECT RPBIC RAD: CPT | Mod: GC | Performed by: UROLOGY

## 2023-05-09 PROCEDURE — 710N000009 HC RECOVERY PHASE 1, LEVEL 1, PER MIN: Performed by: UROLOGY

## 2023-05-09 PROCEDURE — 250N000013 HC RX MED GY IP 250 OP 250 PS 637: Performed by: ANESTHESIOLOGY

## 2023-05-09 PROCEDURE — 88305 TISSUE EXAM BY PATHOLOGIST: CPT | Mod: 26 | Performed by: PATHOLOGY

## 2023-05-09 PROCEDURE — 82435 ASSAY OF BLOOD CHLORIDE: CPT | Performed by: STUDENT IN AN ORGANIZED HEALTH CARE EDUCATION/TRAINING PROGRAM

## 2023-05-09 PROCEDURE — 250N000009 HC RX 250

## 2023-05-09 PROCEDURE — 86901 BLOOD TYPING SEROLOGIC RH(D): CPT | Performed by: UROLOGY

## 2023-05-09 RX ORDER — CEFAZOLIN SODIUM/WATER 2 G/20 ML
2 SYRINGE (ML) INTRAVENOUS SEE ADMIN INSTRUCTIONS
Status: DISCONTINUED | OUTPATIENT
Start: 2023-05-09 | End: 2023-05-10 | Stop reason: HOSPADM

## 2023-05-09 RX ORDER — ONDANSETRON 2 MG/ML
INJECTION INTRAMUSCULAR; INTRAVENOUS PRN
Status: DISCONTINUED | OUTPATIENT
Start: 2023-05-09 | End: 2023-05-09

## 2023-05-09 RX ORDER — ONDANSETRON 4 MG/1
4 TABLET, ORALLY DISINTEGRATING ORAL EVERY 30 MIN PRN
Status: DISCONTINUED | OUTPATIENT
Start: 2023-05-09 | End: 2023-05-10 | Stop reason: HOSPADM

## 2023-05-09 RX ORDER — PROPOFOL 10 MG/ML
INJECTION, EMULSION INTRAVENOUS PRN
Status: DISCONTINUED | OUTPATIENT
Start: 2023-05-09 | End: 2023-05-09

## 2023-05-09 RX ORDER — LIDOCAINE HYDROCHLORIDE 20 MG/ML
INJECTION, SOLUTION INFILTRATION; PERINEURAL PRN
Status: DISCONTINUED | OUTPATIENT
Start: 2023-05-09 | End: 2023-05-09

## 2023-05-09 RX ORDER — HEPARIN SODIUM 5000 [USP'U]/.5ML
5000 INJECTION, SOLUTION INTRAVENOUS; SUBCUTANEOUS
Status: COMPLETED | OUTPATIENT
Start: 2023-05-09 | End: 2023-05-09

## 2023-05-09 RX ORDER — KETOROLAC TROMETHAMINE 30 MG/ML
15 INJECTION, SOLUTION INTRAMUSCULAR; INTRAVENOUS
Status: DISCONTINUED | OUTPATIENT
Start: 2023-05-09 | End: 2023-05-10 | Stop reason: HOSPADM

## 2023-05-09 RX ORDER — KETOROLAC TROMETHAMINE 30 MG/ML
INJECTION, SOLUTION INTRAMUSCULAR; INTRAVENOUS PRN
Status: DISCONTINUED | OUTPATIENT
Start: 2023-05-09 | End: 2023-05-09

## 2023-05-09 RX ORDER — GABAPENTIN 300 MG/1
300 CAPSULE ORAL
Status: COMPLETED | OUTPATIENT
Start: 2023-05-09 | End: 2023-05-09

## 2023-05-09 RX ORDER — HYDROMORPHONE HYDROCHLORIDE 1 MG/ML
0.4 INJECTION, SOLUTION INTRAMUSCULAR; INTRAVENOUS; SUBCUTANEOUS EVERY 5 MIN PRN
Status: DISCONTINUED | OUTPATIENT
Start: 2023-05-09 | End: 2023-05-10 | Stop reason: HOSPADM

## 2023-05-09 RX ORDER — SODIUM CHLORIDE, SODIUM LACTATE, POTASSIUM CHLORIDE, CALCIUM CHLORIDE 600; 310; 30; 20 MG/100ML; MG/100ML; MG/100ML; MG/100ML
INJECTION, SOLUTION INTRAVENOUS CONTINUOUS PRN
Status: DISCONTINUED | OUTPATIENT
Start: 2023-05-09 | End: 2023-05-09

## 2023-05-09 RX ORDER — HYDROMORPHONE HYDROCHLORIDE 1 MG/ML
0.2 INJECTION, SOLUTION INTRAMUSCULAR; INTRAVENOUS; SUBCUTANEOUS EVERY 5 MIN PRN
Status: DISCONTINUED | OUTPATIENT
Start: 2023-05-09 | End: 2023-05-10 | Stop reason: HOSPADM

## 2023-05-09 RX ORDER — FENTANYL CITRATE 50 UG/ML
25 INJECTION, SOLUTION INTRAMUSCULAR; INTRAVENOUS EVERY 5 MIN PRN
Status: DISCONTINUED | OUTPATIENT
Start: 2023-05-09 | End: 2023-05-10 | Stop reason: HOSPADM

## 2023-05-09 RX ORDER — ACETAMINOPHEN 325 MG/1
975 TABLET ORAL ONCE
Status: COMPLETED | OUTPATIENT
Start: 2023-05-09 | End: 2023-05-09

## 2023-05-09 RX ORDER — FENTANYL CITRATE 50 UG/ML
50 INJECTION, SOLUTION INTRAMUSCULAR; INTRAVENOUS EVERY 5 MIN PRN
Status: DISCONTINUED | OUTPATIENT
Start: 2023-05-09 | End: 2023-05-10 | Stop reason: HOSPADM

## 2023-05-09 RX ORDER — SODIUM CHLORIDE, SODIUM LACTATE, POTASSIUM CHLORIDE, CALCIUM CHLORIDE 600; 310; 30; 20 MG/100ML; MG/100ML; MG/100ML; MG/100ML
INJECTION, SOLUTION INTRAVENOUS CONTINUOUS
Status: DISCONTINUED | OUTPATIENT
Start: 2023-05-09 | End: 2023-05-10 | Stop reason: HOSPADM

## 2023-05-09 RX ORDER — ONDANSETRON 2 MG/ML
4 INJECTION INTRAMUSCULAR; INTRAVENOUS EVERY 30 MIN PRN
Status: DISCONTINUED | OUTPATIENT
Start: 2023-05-09 | End: 2023-05-10 | Stop reason: HOSPADM

## 2023-05-09 RX ORDER — DEXAMETHASONE SODIUM PHOSPHATE 4 MG/ML
INJECTION, SOLUTION INTRA-ARTICULAR; INTRALESIONAL; INTRAMUSCULAR; INTRAVENOUS; SOFT TISSUE PRN
Status: DISCONTINUED | OUTPATIENT
Start: 2023-05-09 | End: 2023-05-09

## 2023-05-09 RX ORDER — LABETALOL HYDROCHLORIDE 5 MG/ML
INJECTION, SOLUTION INTRAVENOUS PRN
Status: DISCONTINUED | OUTPATIENT
Start: 2023-05-09 | End: 2023-05-09

## 2023-05-09 RX ORDER — CEFAZOLIN SODIUM/WATER 2 G/20 ML
2 SYRINGE (ML) INTRAVENOUS
Status: DISCONTINUED | OUTPATIENT
Start: 2023-05-09 | End: 2023-05-10 | Stop reason: HOSPADM

## 2023-05-09 RX ORDER — BUPIVACAINE HYDROCHLORIDE 2.5 MG/ML
INJECTION, SOLUTION INFILTRATION; PERINEURAL PRN
Status: DISCONTINUED | OUTPATIENT
Start: 2023-05-09 | End: 2023-05-10 | Stop reason: HOSPADM

## 2023-05-09 RX ORDER — FENTANYL CITRATE 50 UG/ML
INJECTION, SOLUTION INTRAMUSCULAR; INTRAVENOUS PRN
Status: DISCONTINUED | OUTPATIENT
Start: 2023-05-09 | End: 2023-05-09

## 2023-05-09 RX ORDER — OXYBUTYNIN CHLORIDE 5 MG/1
5 TABLET, EXTENDED RELEASE ORAL AT BEDTIME
Status: DISCONTINUED | OUTPATIENT
Start: 2023-05-09 | End: 2023-05-10 | Stop reason: HOSPADM

## 2023-05-09 RX ADMIN — FENTANYL CITRATE 150 MCG: 50 INJECTION, SOLUTION INTRAMUSCULAR; INTRAVENOUS at 16:41

## 2023-05-09 RX ADMIN — HYDROMORPHONE HYDROCHLORIDE 0.4 MG: 1 INJECTION, SOLUTION INTRAMUSCULAR; INTRAVENOUS; SUBCUTANEOUS at 23:23

## 2023-05-09 RX ADMIN — Medication 20 MG: at 17:16

## 2023-05-09 RX ADMIN — Medication 50 MG: at 16:41

## 2023-05-09 RX ADMIN — ACETAMINOPHEN 975 MG: 325 TABLET ORAL at 23:39

## 2023-05-09 RX ADMIN — SODIUM CHLORIDE, POTASSIUM CHLORIDE, SODIUM LACTATE AND CALCIUM CHLORIDE: 600; 310; 30; 20 INJECTION, SOLUTION INTRAVENOUS at 23:55

## 2023-05-09 RX ADMIN — Medication 20 MG: at 19:37

## 2023-05-09 RX ADMIN — HYDROMORPHONE HYDROCHLORIDE 0.4 MG: 1 INJECTION, SOLUTION INTRAMUSCULAR; INTRAVENOUS; SUBCUTANEOUS at 23:50

## 2023-05-09 RX ADMIN — Medication 2 G: at 16:48

## 2023-05-09 RX ADMIN — PROPOFOL 150 MG: 10 INJECTION, EMULSION INTRAVENOUS at 16:41

## 2023-05-09 RX ADMIN — Medication 2 G: at 20:46

## 2023-05-09 RX ADMIN — ONDANSETRON 4 MG: 2 INJECTION INTRAMUSCULAR; INTRAVENOUS at 22:10

## 2023-05-09 RX ADMIN — FENTANYL CITRATE 50 MCG: 50 INJECTION, SOLUTION INTRAMUSCULAR; INTRAVENOUS at 20:18

## 2023-05-09 RX ADMIN — LABETALOL HYDROCHLORIDE 5 MG: 5 INJECTION INTRAVENOUS at 18:15

## 2023-05-09 RX ADMIN — LIDOCAINE HYDROCHLORIDE 100 MG: 20 INJECTION, SOLUTION INFILTRATION; PERINEURAL at 16:41

## 2023-05-09 RX ADMIN — OXYBUTYNIN CHLORIDE 5 MG: 5 TABLET, EXTENDED RELEASE ORAL at 23:40

## 2023-05-09 RX ADMIN — SUGAMMADEX 200 MG: 100 INJECTION, SOLUTION INTRAVENOUS at 22:10

## 2023-05-09 RX ADMIN — SODIUM CHLORIDE, POTASSIUM CHLORIDE, SODIUM LACTATE AND CALCIUM CHLORIDE: 600; 310; 30; 20 INJECTION, SOLUTION INTRAVENOUS at 18:00

## 2023-05-09 RX ADMIN — ONDANSETRON 4 MG: 2 INJECTION INTRAMUSCULAR; INTRAVENOUS at 17:15

## 2023-05-09 RX ADMIN — SODIUM CHLORIDE, POTASSIUM CHLORIDE, SODIUM LACTATE AND CALCIUM CHLORIDE: 600; 310; 30; 20 INJECTION, SOLUTION INTRAVENOUS at 22:03

## 2023-05-09 RX ADMIN — Medication 20 MG: at 18:55

## 2023-05-09 RX ADMIN — FENTANYL CITRATE 100 MCG: 50 INJECTION, SOLUTION INTRAMUSCULAR; INTRAVENOUS at 17:21

## 2023-05-09 RX ADMIN — DEXAMETHASONE SODIUM PHOSPHATE 4 MG: 4 INJECTION, SOLUTION INTRA-ARTICULAR; INTRALESIONAL; INTRAMUSCULAR; INTRAVENOUS; SOFT TISSUE at 16:52

## 2023-05-09 RX ADMIN — FENTANYL CITRATE 50 MCG: 50 INJECTION, SOLUTION INTRAMUSCULAR; INTRAVENOUS at 21:41

## 2023-05-09 RX ADMIN — Medication 10 MG: at 21:20

## 2023-05-09 RX ADMIN — LABETALOL HYDROCHLORIDE 5 MG: 5 INJECTION INTRAVENOUS at 17:43

## 2023-05-09 RX ADMIN — FENTANYL CITRATE 50 MCG: 50 INJECTION, SOLUTION INTRAMUSCULAR; INTRAVENOUS at 23:05

## 2023-05-09 RX ADMIN — MIDAZOLAM 2 MG: 1 INJECTION INTRAMUSCULAR; INTRAVENOUS at 16:33

## 2023-05-09 RX ADMIN — Medication 10 MG: at 20:50

## 2023-05-09 RX ADMIN — ACETAMINOPHEN 975 MG: 325 TABLET ORAL at 13:15

## 2023-05-09 RX ADMIN — Medication 30 MG: at 17:54

## 2023-05-09 RX ADMIN — FENTANYL CITRATE 50 MCG: 50 INJECTION, SOLUTION INTRAMUSCULAR; INTRAVENOUS at 23:11

## 2023-05-09 RX ADMIN — GABAPENTIN 300 MG: 300 CAPSULE ORAL at 13:15

## 2023-05-09 RX ADMIN — KETOROLAC TROMETHAMINE 15 MG: 30 INJECTION, SOLUTION INTRAMUSCULAR at 22:09

## 2023-05-09 RX ADMIN — HEPARIN SODIUM 5000 UNITS: 5000 INJECTION, SOLUTION INTRAVENOUS; SUBCUTANEOUS at 13:15

## 2023-05-09 RX ADMIN — PHENYLEPHRINE HYDROCHLORIDE 100 MCG: 10 INJECTION INTRAVENOUS at 17:14

## 2023-05-09 RX ADMIN — LABETALOL HYDROCHLORIDE 5 MG: 5 INJECTION INTRAVENOUS at 17:38

## 2023-05-09 RX ADMIN — HYDROMORPHONE HYDROCHLORIDE 0.5 MG: 1 INJECTION, SOLUTION INTRAMUSCULAR; INTRAVENOUS; SUBCUTANEOUS at 17:28

## 2023-05-09 RX ADMIN — SODIUM CHLORIDE, POTASSIUM CHLORIDE, SODIUM LACTATE AND CALCIUM CHLORIDE: 600; 310; 30; 20 INJECTION, SOLUTION INTRAVENOUS at 16:33

## 2023-05-09 RX ADMIN — Medication 10 MG: at 20:17

## 2023-05-09 RX ADMIN — Medication 10 MG: at 21:47

## 2023-05-09 RX ADMIN — HYDROMORPHONE HYDROCHLORIDE 0.5 MG: 1 INJECTION, SOLUTION INTRAMUSCULAR; INTRAVENOUS; SUBCUTANEOUS at 18:56

## 2023-05-09 ASSESSMENT — ACTIVITIES OF DAILY LIVING (ADL)
ADLS_ACUITY_SCORE: 20
ADLS_ACUITY_SCORE: 20
ADLS_ACUITY_SCORE: 18
ADLS_ACUITY_SCORE: 20

## 2023-05-09 NOTE — PHARMACY-ADMISSION MEDICATION HISTORY
Pharmacist Admission Medication History    Admission medication history is complete. The information provided in this note is only as accurate as the sources available at the time of the update.    Medication reconciliation/reorder completed by provider prior to medication history? No    Information Source(s):  Pre-op RN assessment, Dispense history       Medication History Completed By: Yadira Mckoy [facility] 5/9/2023 6:44 PM    Prior to Admission medications    Medication Sig Last Dose Taking? Auth Provider Long Term End Date   Acetaminophen (TYLENOL 8 HOUR ARTHRITIS PAIN PO) Take 1 tablet by mouth 2 times daily 5/9/2023 at 0730 Yes Reported, Patient     diazepam (VALIUM) 2 MG tablet Take 1 tablet (2 mg) by mouth every 12 hours as needed (overwhelming anxiety/worry) 5/8/2023 Yes Jagruti Ahumada MD     nitroFURantoin macrocrystal-monohydrate (MACROBID) 100 MG capsule Take 1 capsule (100 mg) by mouth 2 times daily for 5 days 5/9/2023 Yes Shabbir Gerard MD  5/10/23   SAW PALMETTO-PUMPKIN SEED OIL PO Take 1 tablet by mouth 2 times daily 5/2/2023  Reported, Patient
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
## 2023-05-09 NOTE — ANESTHESIA PROCEDURE NOTES
Airway       Patient location during procedure: OR       Procedure Start/Stop Times: 5/9/2023 4:52 PM  Staff -        CRNA: Jagruti Zamora APRN CRNA       Performed By: CRNA  Consent for Airway        Urgency: elective  Indications and Patient Condition       Indications for airway management: james-procedural       Induction type:intravenous       Mask difficulty assessment: 2 - vent by mask + OA or adjuvant +/- NMBA    Final Airway Details       Final airway type: endotracheal airway       Successful airway: ETT - single  Endotracheal Airway Details        ETT size (mm): 8.0       Successful intubation technique: direct laryngoscopy       DL Blade Type: MAC 4       Grade View of Cords: 1       Adjucts: stylet       Position: Right       Measured from: lips       Secured at (cm): 22       Bite block used: None    Post intubation assessment        Placement verified by: capnometry, equal breath sounds and chest rise        Number of attempts at approach: 1       Secured with: pink tape       Ease of procedure: easy       Dentition: Intact and Unchanged    Medication(s) Administered   Medication Administration Time: 5/9/2023 4:52 PM

## 2023-05-10 VITALS
SYSTOLIC BLOOD PRESSURE: 123 MMHG | WEIGHT: 205.03 LBS | OXYGEN SATURATION: 94 % | RESPIRATION RATE: 12 BRPM | HEART RATE: 76 BPM | DIASTOLIC BLOOD PRESSURE: 67 MMHG | HEIGHT: 74 IN | BODY MASS INDEX: 26.31 KG/M2 | TEMPERATURE: 98 F

## 2023-05-10 LAB
ANION GAP SERPL CALCULATED.3IONS-SCNC: 13 MMOL/L (ref 7–15)
ANION GAP SERPL CALCULATED.3IONS-SCNC: 13 MMOL/L (ref 7–15)
BUN SERPL-MCNC: 12.6 MG/DL (ref 8–23)
BUN SERPL-MCNC: 13.4 MG/DL (ref 8–23)
CALCIUM SERPL-MCNC: 8.6 MG/DL (ref 8.8–10.2)
CALCIUM SERPL-MCNC: 8.7 MG/DL (ref 8.8–10.2)
CHLORIDE SERPL-SCNC: 104 MMOL/L (ref 98–107)
CHLORIDE SERPL-SCNC: 105 MMOL/L (ref 98–107)
CREAT FLD-MCNC: 1.3 MG/DL
CREAT SERPL-MCNC: 1.19 MG/DL (ref 0.67–1.17)
CREAT SERPL-MCNC: 1.22 MG/DL (ref 0.67–1.17)
CREATININE BODY FLUID SOURCE: NORMAL
DEPRECATED HCO3 PLAS-SCNC: 21 MMOL/L (ref 22–29)
DEPRECATED HCO3 PLAS-SCNC: 23 MMOL/L (ref 22–29)
ERYTHROCYTE [DISTWIDTH] IN BLOOD BY AUTOMATED COUNT: 12.9 % (ref 10–15)
GFR SERPL CREATININE-BSD FRML MDRD: 67 ML/MIN/1.73M2
GFR SERPL CREATININE-BSD FRML MDRD: 69 ML/MIN/1.73M2
GLUCOSE BLDC GLUCOMTR-MCNC: 105 MG/DL (ref 70–99)
GLUCOSE SERPL-MCNC: 128 MG/DL (ref 70–99)
GLUCOSE SERPL-MCNC: 145 MG/DL (ref 70–99)
HCT VFR BLD AUTO: 39.7 % (ref 40–53)
HGB BLD-MCNC: 13.1 G/DL (ref 13.3–17.7)
MCH RBC QN AUTO: 31 PG (ref 26.5–33)
MCHC RBC AUTO-ENTMCNC: 33 G/DL (ref 31.5–36.5)
MCV RBC AUTO: 94 FL (ref 78–100)
PLATELET # BLD AUTO: 274 10E3/UL (ref 150–450)
POTASSIUM SERPL-SCNC: 4 MMOL/L (ref 3.4–5.3)
POTASSIUM SERPL-SCNC: 4.6 MMOL/L (ref 3.4–5.3)
RBC # BLD AUTO: 4.23 10E6/UL (ref 4.4–5.9)
SODIUM SERPL-SCNC: 139 MMOL/L (ref 136–145)
SODIUM SERPL-SCNC: 140 MMOL/L (ref 136–145)
WBC # BLD AUTO: 11.9 10E3/UL (ref 4–11)

## 2023-05-10 PROCEDURE — 250N000013 HC RX MED GY IP 250 OP 250 PS 637: Performed by: STUDENT IN AN ORGANIZED HEALTH CARE EDUCATION/TRAINING PROGRAM

## 2023-05-10 PROCEDURE — 258N000003 HC RX IP 258 OP 636: Performed by: STUDENT IN AN ORGANIZED HEALTH CARE EDUCATION/TRAINING PROGRAM

## 2023-05-10 PROCEDURE — 82570 ASSAY OF URINE CREATININE: CPT | Performed by: STUDENT IN AN ORGANIZED HEALTH CARE EDUCATION/TRAINING PROGRAM

## 2023-05-10 PROCEDURE — 96375 TX/PRO/DX INJ NEW DRUG ADDON: CPT

## 2023-05-10 PROCEDURE — 96376 TX/PRO/DX INJ SAME DRUG ADON: CPT

## 2023-05-10 PROCEDURE — 82310 ASSAY OF CALCIUM: CPT | Performed by: STUDENT IN AN ORGANIZED HEALTH CARE EDUCATION/TRAINING PROGRAM

## 2023-05-10 PROCEDURE — 36415 COLL VENOUS BLD VENIPUNCTURE: CPT | Performed by: STUDENT IN AN ORGANIZED HEALTH CARE EDUCATION/TRAINING PROGRAM

## 2023-05-10 PROCEDURE — 96374 THER/PROPH/DIAG INJ IV PUSH: CPT

## 2023-05-10 PROCEDURE — 250N000011 HC RX IP 250 OP 636: Performed by: STUDENT IN AN ORGANIZED HEALTH CARE EDUCATION/TRAINING PROGRAM

## 2023-05-10 PROCEDURE — 85027 COMPLETE CBC AUTOMATED: CPT | Performed by: STUDENT IN AN ORGANIZED HEALTH CARE EDUCATION/TRAINING PROGRAM

## 2023-05-10 RX ORDER — HYDROMORPHONE HCL IN WATER/PF 6 MG/30 ML
0.4 PATIENT CONTROLLED ANALGESIA SYRINGE INTRAVENOUS
Status: DISCONTINUED | OUTPATIENT
Start: 2023-05-10 | End: 2023-05-10 | Stop reason: HOSPADM

## 2023-05-10 RX ORDER — OXYBUTYNIN CHLORIDE 5 MG/1
5 TABLET, EXTENDED RELEASE ORAL AT BEDTIME
Qty: 7 TABLET | Refills: 0 | Status: SHIPPED | OUTPATIENT
Start: 2023-05-10 | End: 2023-07-18

## 2023-05-10 RX ORDER — CIPROFLOXACIN 500 MG/1
500 TABLET, FILM COATED ORAL ONCE
Qty: 1 TABLET | Refills: 0 | Status: SHIPPED | OUTPATIENT
Start: 2023-05-10 | End: 2023-05-10

## 2023-05-10 RX ORDER — AMOXICILLIN 250 MG
1-2 CAPSULE ORAL 2 TIMES DAILY
Qty: 45 TABLET | Refills: 0 | Status: SHIPPED | OUTPATIENT
Start: 2023-05-10 | End: 2023-07-18

## 2023-05-10 RX ORDER — ONDANSETRON 4 MG/1
4 TABLET, ORALLY DISINTEGRATING ORAL EVERY 6 HOURS PRN
Status: DISCONTINUED | OUTPATIENT
Start: 2023-05-10 | End: 2023-05-10 | Stop reason: HOSPADM

## 2023-05-10 RX ORDER — LIDOCAINE 50 MG/G
OINTMENT TOPICAL 4 TIMES DAILY PRN
Status: DISCONTINUED | OUTPATIENT
Start: 2023-05-10 | End: 2023-05-10 | Stop reason: HOSPADM

## 2023-05-10 RX ORDER — DIAZEPAM 2 MG
2 TABLET ORAL EVERY 12 HOURS PRN
Status: DISCONTINUED | OUTPATIENT
Start: 2023-05-10 | End: 2023-05-10 | Stop reason: HOSPADM

## 2023-05-10 RX ORDER — NALOXONE HYDROCHLORIDE 0.4 MG/ML
0.2 INJECTION, SOLUTION INTRAMUSCULAR; INTRAVENOUS; SUBCUTANEOUS
Status: DISCONTINUED | OUTPATIENT
Start: 2023-05-10 | End: 2023-05-10 | Stop reason: HOSPADM

## 2023-05-10 RX ORDER — NEOMYCIN/BACITRACIN/POLYMYXINB 3.5-400-5K
OINTMENT (GRAM) TOPICAL 3 TIMES DAILY
Qty: 15 G | Refills: 0 | Status: SHIPPED | OUTPATIENT
Start: 2023-05-10 | End: 2023-07-18

## 2023-05-10 RX ORDER — OXYCODONE HYDROCHLORIDE 10 MG/1
10 TABLET ORAL EVERY 4 HOURS PRN
Status: DISCONTINUED | OUTPATIENT
Start: 2023-05-10 | End: 2023-05-10 | Stop reason: HOSPADM

## 2023-05-10 RX ORDER — OXYCODONE HYDROCHLORIDE 5 MG/1
5 TABLET ORAL EVERY 4 HOURS PRN
Qty: 12 TABLET | Refills: 0 | Status: SHIPPED | OUTPATIENT
Start: 2023-05-10 | End: 2023-07-18

## 2023-05-10 RX ORDER — NALOXONE HYDROCHLORIDE 0.4 MG/ML
0.4 INJECTION, SOLUTION INTRAMUSCULAR; INTRAVENOUS; SUBCUTANEOUS
Status: DISCONTINUED | OUTPATIENT
Start: 2023-05-10 | End: 2023-05-10 | Stop reason: HOSPADM

## 2023-05-10 RX ORDER — ONDANSETRON 2 MG/ML
4 INJECTION INTRAMUSCULAR; INTRAVENOUS EVERY 6 HOURS PRN
Status: DISCONTINUED | OUTPATIENT
Start: 2023-05-10 | End: 2023-05-10 | Stop reason: HOSPADM

## 2023-05-10 RX ORDER — HYDROMORPHONE HCL IN WATER/PF 6 MG/30 ML
0.2 PATIENT CONTROLLED ANALGESIA SYRINGE INTRAVENOUS
Status: DISCONTINUED | OUTPATIENT
Start: 2023-05-10 | End: 2023-05-10 | Stop reason: HOSPADM

## 2023-05-10 RX ORDER — DOCUSATE SODIUM 100 MG/1
100 CAPSULE, LIQUID FILLED ORAL 2 TIMES DAILY
Status: DISCONTINUED | OUTPATIENT
Start: 2023-05-10 | End: 2023-05-10 | Stop reason: HOSPADM

## 2023-05-10 RX ORDER — OXYCODONE HYDROCHLORIDE 5 MG/1
5 TABLET ORAL EVERY 4 HOURS PRN
Status: DISCONTINUED | OUTPATIENT
Start: 2023-05-10 | End: 2023-05-10 | Stop reason: HOSPADM

## 2023-05-10 RX ORDER — LIDOCAINE 40 MG/G
CREAM TOPICAL
Status: DISCONTINUED | OUTPATIENT
Start: 2023-05-10 | End: 2023-05-10 | Stop reason: HOSPADM

## 2023-05-10 RX ORDER — LANOLIN ALCOHOL/MO/W.PET/CERES
3 CREAM (GRAM) TOPICAL
Status: DISCONTINUED | OUTPATIENT
Start: 2023-05-10 | End: 2023-05-10 | Stop reason: HOSPADM

## 2023-05-10 RX ORDER — ACETAMINOPHEN 325 MG/1
650 TABLET ORAL EVERY 4 HOURS
Status: DISCONTINUED | OUTPATIENT
Start: 2023-05-10 | End: 2023-05-10 | Stop reason: HOSPADM

## 2023-05-10 RX ORDER — POLYETHYLENE GLYCOL 3350 17 G/17G
17 POWDER, FOR SOLUTION ORAL DAILY
Status: DISCONTINUED | OUTPATIENT
Start: 2023-05-10 | End: 2023-05-10 | Stop reason: HOSPADM

## 2023-05-10 RX ORDER — NEOMYCIN/BACITRACIN/POLYMYXINB 3.5-400-5K
OINTMENT (GRAM) TOPICAL 4 TIMES DAILY PRN
Status: DISCONTINUED | OUTPATIENT
Start: 2023-05-10 | End: 2023-05-10 | Stop reason: HOSPADM

## 2023-05-10 RX ORDER — NITROFURANTOIN 25; 75 MG/1; MG/1
100 CAPSULE ORAL 2 TIMES DAILY
Status: DISCONTINUED | OUTPATIENT
Start: 2023-05-10 | End: 2023-05-10 | Stop reason: HOSPADM

## 2023-05-10 RX ORDER — SODIUM CHLORIDE, SODIUM LACTATE, POTASSIUM CHLORIDE, CALCIUM CHLORIDE 600; 310; 30; 20 MG/100ML; MG/100ML; MG/100ML; MG/100ML
INJECTION, SOLUTION INTRAVENOUS CONTINUOUS
Status: DISCONTINUED | OUTPATIENT
Start: 2023-05-10 | End: 2023-05-10 | Stop reason: HOSPADM

## 2023-05-10 RX ORDER — KETOROLAC TROMETHAMINE 15 MG/ML
15 INJECTION, SOLUTION INTRAMUSCULAR; INTRAVENOUS EVERY 6 HOURS
Status: DISCONTINUED | OUTPATIENT
Start: 2023-05-10 | End: 2023-05-10 | Stop reason: HOSPADM

## 2023-05-10 RX ADMIN — OXYCODONE HYDROCHLORIDE 5 MG: 5 TABLET ORAL at 01:17

## 2023-05-10 RX ADMIN — DOCUSATE SODIUM 100 MG: 100 CAPSULE, LIQUID FILLED ORAL at 07:59

## 2023-05-10 RX ADMIN — SODIUM CHLORIDE, POTASSIUM CHLORIDE, SODIUM LACTATE AND CALCIUM CHLORIDE: 600; 310; 30; 20 INJECTION, SOLUTION INTRAVENOUS at 09:03

## 2023-05-10 RX ADMIN — KETOROLAC TROMETHAMINE 15 MG: 15 INJECTION, SOLUTION INTRAMUSCULAR; INTRAVENOUS at 03:45

## 2023-05-10 RX ADMIN — OXYCODONE HYDROCHLORIDE 5 MG: 5 TABLET ORAL at 05:29

## 2023-05-10 RX ADMIN — NITROFURANTOIN MONOHYDRATE/MACROCRYSTALS 100 MG: 25; 75 CAPSULE ORAL at 07:59

## 2023-05-10 RX ADMIN — OXYCODONE HYDROCHLORIDE 5 MG: 5 TABLET ORAL at 13:01

## 2023-05-10 RX ADMIN — ACETAMINOPHEN 650 MG: 325 TABLET ORAL at 07:59

## 2023-05-10 RX ADMIN — Medication 1 LOZENGE: at 01:43

## 2023-05-10 RX ADMIN — KETOROLAC TROMETHAMINE 15 MG: 15 INJECTION, SOLUTION INTRAMUSCULAR; INTRAVENOUS at 10:37

## 2023-05-10 RX ADMIN — POLYETHYLENE GLYCOL 3350 17 G: 17 POWDER, FOR SOLUTION ORAL at 08:01

## 2023-05-10 RX ADMIN — ACETAMINOPHEN 650 MG: 325 TABLET ORAL at 12:56

## 2023-05-10 ASSESSMENT — ACTIVITIES OF DAILY LIVING (ADL)
ADLS_ACUITY_SCORE: 26
ADLS_ACUITY_SCORE: 25
ADLS_ACUITY_SCORE: 26
ADLS_ACUITY_SCORE: 25

## 2023-05-10 NOTE — DISCHARGE SUMMARY
"[facility]   Urology Discharge Summary    Date of Admission: 5/9/2023  Date of Discharge: 05/10/2023    Admission Diagnosis:  Prostate cancer (H) [C61]    Discharge Diagnosis:  1. Same as above    Consultations:  None     Procedures:  Procedure(s):  ROBOTIC ASSISTED LAPAROSCOPIC RADICAL PROSTATECTOMY BILATERAL PELVIC LYMPHADENECTOMY    Brief HPI:  Dakota Myers is a 62 year old year old male with history of prostate cancer. After discussion of the risks, benefits, and alternatives, they underwent the aforementioned procedure.     Hospital Course:  The patient tolerated the procedure well without complications and was transferred to the floor post-operatively.The patient's diet was slowly advanced as bowel function returned. Pain was controlled with oral pain medication and the patient was able to ambulate without difficulty. The patient received appropriate education post operatively. On 05/10/2023 the patient was discharged to home. He will follow up on 5/17 for Hill catheter removal. ANAMIKA drain was removed prior to discharge.      Discharge Physical Exam:  /67 (BP Location: Left arm)   Pulse 76   Temp 98  F (36.7  C) (Temporal)   Resp 12   Ht 1.88 m (6' 2\")   Wt 93 kg (205 lb 0.4 oz)   SpO2 94%   BMI 26.32 kg/m      No acute distress  Unlabored breathing  Abdomen soft, nontender, nondistended. Incisions c/d/i, dressed with Primapores, minimal strikethrough.   ANAMIKA serosanguinous  Hill with clear yellow urine in tubing    Meds:       Review of your medicines      START taking      Dose / Directions   ciprofloxacin 500 MG tablet  Commonly known as: CIPRO      Dose: 500 mg  Take 1 tablet (500 mg) by mouth once for 1 dose Taken prior to Hill catheter removal on 5/17  Quantity: 1 tablet  Refills: 0     neomycin-bacitracin-polymyxin 5-400-5000 ointment      Apply topically 3 times daily To urinary meatus (catheter insertion site)  Quantity: 15 g  Refills: 0     oxybutynin " ER 5 MG 24 hr tablet  Commonly known as: DITROPAN XL      Dose: 5 mg  Take 1 tablet (5 mg) by mouth At Bedtime As needed to prevent bladder spasms  Quantity: 7 tablet  Refills: 0     oxyCODONE 5 MG tablet  Commonly known as: ROXICODONE      Dose: 5 mg  Take 1 tablet (5 mg) by mouth every 4 hours as needed for moderate to severe pain  Quantity: 12 tablet  Refills: 0     senna-docusate 8.6-50 MG tablet  Commonly known as: SENOKOT-S/PERICOLACE      Dose: 1-2 tablet  Take 1-2 tablets by mouth 2 times daily To prevent/ treat constipation  Quantity: 45 tablet  Refills: 0        CONTINUE these medicines which have NOT CHANGED      Dose / Directions   diazepam 2 MG tablet  Commonly known as: VALIUM  Used for: Acute adjustment disorder with anxiety      Dose: 2 mg  Take 1 tablet (2 mg) by mouth every 12 hours as needed (overwhelming anxiety/worry)  Quantity: 10 tablet  Refills: 0     SAW PALMETTO-PUMPKIN SEED OIL PO      Dose: 1 tablet  Take 1 tablet by mouth 2 times daily  Refills: 0     TYLENOL 8 HOUR ARTHRITIS PAIN PO      Dose: 1 tablet  Take 1 tablet by mouth 2 times daily  Refills: 0        STOP taking    nitroFURantoin macrocrystal-monohydrate 100 MG capsule  Commonly known as: MACROBID              Where to get your medicines      These medications were sent to [facility] - [place], [place] - [address]  [address] [address]    Phone: [phone]     ciprofloxacin 500 MG tablet    neomycin-bacitracin-polymyxin 5-400-5000 ointment    oxybutynin ER 5 MG 24 hr tablet    oxyCODONE 5 MG tablet    senna-docusate 8.6-50 MG tablet         Additional instructions:  After Care     Future Labs/Procedures    Activity     Comments:    Your activity upon discharge:   - No strenuous exercise for 6 weeks.   - No lifting, pushing, pulling more than 10 pounds for 6 weeks. Take care when pushing with your arms to stand up.  - Do not strain your belly area.  When you bend, sit up or twice,  you could strain the area around your incision.    - Do not strain with bowel movements.    - Do not drive until you can press the brake pedal quickly and fully without pain.   - Do not operate a motor vehicle while taking narcotic pain medications.    Diet     Comments:    Follow this diet upon discharge:Regular/ home diet    Discharge Instructions     Comments:    Medications:   1) PAIN: Oxycodone is a Opiate medication that has been prescribed for pain.  Opiates will cause sleepiness and constipation and can become addictive, therefore it is best to stop or reduce them as soon as you can.  Any left over Opiates should be disposed of with an Authorized  for unneeded medications.  Contact your [facility]'s or [facility]'s household trash and recycling service to learn about medication disposal options and guidelines for your area.  If you decide to store this medication at home it should be kept in a locked cabinet to prevent access to children or visitors. Never drive, operate machinery or drink alcoholic beverages while you are taking Opiate pain medications.  To reduce your Opiate use, take both Tylenol (acetaminophen 625mg) and ibuprofen (600mg) as directed.  These have been prescribed for you.  Do not take more than 4,000mg of Tylenol (acetaminophen/ APAP) from all sources in any 24 hour period since this can cause liver damage.  Do not take more than 2400mg of ibuprofen in any 24 hour period since this can cause kidney damage.      2) CONSTIPATION: Pericolace (senna/docusate sodium) can be taken twice daily for prevention of constipation since surgery, pain medications and bladder spasm medications can all make you constipated.  Please reduce or stop pericolace if you develop loose stools. Other over the counter solutions such as prune juice, miralax, fiber products, senna, and dulcolax can also be used. If you are taking the pericolace but still have not had a bowel movement in 3 days, start  over-the-counter Milk of Magnesia taken twice daily until you have a good result.  Call the office with any concerns.     3) ANTIBIOTICS  - Ciprofloxacin 500mg #1 tablet should be taken one hour prior to your followup appointment with Dr. Gerard to remove your Hill catheter  - Triple Antibiotic ointment has been prescribed to apply 2-3 times per day to the catheter insertion site (tip of penis)    Tubes and drains     Comments:    You are going home with the following tubes or drains:     Drains:  1) Hill Catheter: You are going home with a Hill catheter which will remain in place until your follow-up appointment and should NOT be removed or replaced. Your nurse or  will provide written catheter care instructions for you to take home.  - Protect the catheter and treat it like an extension of your body - keep it secured to your leg and do not let it get caught, snagged, or tugged.  - Should the catheter somehow come out of position, do not let anyone but a urologist who is aware of your recent surgery try to replace a catheter.  Best yet, contact our urology office right away with any concerns.   - Apply triple antibiotic ointment or vaseline ointment 2-3 times daily to the tip of the penis/catheter insertion point to keep the area lubricated and more comfortable.     2) Abdominal Drain to bulb suction:   - Your abdominal drain has been removed.  While the site is healing, change dressings once per day or more frequently if soiled or wet, to keep the site clean.  Once the site has healed, remove dressings completely to leave the site uncovered.    Wound care and dressings     Comments:    Instructions to care for your wound at home:  Wound Care:  - You may shower and get incisions wet starting 48 hrs after surgery.  - Do not scrub incisions or submerge wounds (aka, bath, pool, hot tub, etc.) until catheter is removed and wounds have fully healed, typically 4 weeks.  - Remove wound dressing 48 hours  "after surgery if already not removed.   - Your incisions were closed with dissolvable suture that will not need to be removed.  Commonly, purple dermabond glue is applied over the top of the sutures.  Avoid using lotions or ointments on your incisions.    - Leave incisions open to air.  Cover with gauze only if needed for comfort or to protect clothing from drainage.          Follow Up:  - Follow-up with your urologist as scheduled   - Call or return sooner than your regularly scheduled visit if you develop any of the following: fever (greater than 101.5), uncontrolled pain, uncontrolled nausea or vomiting, as well as increased redness, swelling, or drainage from your wound.     Phone numbers:   - Monday through Friday 8am to 4:30pm: Call [phone] with questions or to schedule or confirm appointment.    - Nights or weekends: call the after hours emergency pager - [phone] and tell the  \"I would like to page the Urology Resident on call.\"  - For emergencies, call [phone]    The patient was discussed with the chief resident and staff on the day of discharge.     Lizette Sloan MD  Urology Resident     "
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
## 2023-05-10 NOTE — PLAN OF CARE
Orders to discharge home. AVS reviewed with pt. Questions answered. PIVs removed. ANAMIKA drain removed by provider. Hill education completed; supplies given to pt. Transport ordered to wheel pt down to lobby/discharge pharmacy.

## 2023-05-10 NOTE — ANESTHESIA CARE TRANSFER NOTE
Patient: Dakota Myers    Procedure: Procedure(s):  ROBOTIC ASSISTED LAPAROSCOPIC RADICAL PROSTATECTOMY BILATERAL PELVIC LYMPHADENECTOMY       Diagnosis: Prostate cancer (H) [C61]  Diagnosis Additional Information: No value filed.    Anesthesia Type:   General     Note:    Oropharynx: oropharynx clear of all foreign objects and spontaneously breathing  Level of Consciousness: awake  Oxygen Supplementation: face mask  Level of Supplemental Oxygen (L/min / FiO2): 8  Independent Airway: airway patency satisfactory and stable  Dentition: dentition unchanged  Vital Signs Stable: post-procedure vital signs reviewed and stable  Report to RN Given: handoff report given  Patient transferred to: PACU    Handoff Report: Identifed the Patient, Identified the Reponsible Provider, Reviewed the pertinent medical history, Discussed the surgical course, Reviewed Intra-OP anesthesia mangement and issues during anesthesia, Set expectations for post-procedure period and Allowed opportunity for questions and acknowledgement of understanding      Vitals:  Vitals Value Taken Time   /86 05/09/23 2230   Temp     Pulse 82 05/09/23 2232   Resp 18 05/09/23 2232   SpO2 98 % 05/09/23 2232   Vitals shown include unvalidated device data.    Electronically Signed By: SAGAR Philippe CRNA  May 9, 2023  10:33 PM

## 2023-05-10 NOTE — BRIEF OP NOTE
[facility]    Brief Operative Note    Pre-operative diagnosis: Prostate cancer (H) [C61]  Post-operative diagnosis Same as pre-operative diagnosis    Procedure: Procedure(s):  ROBOTIC ASSISTED LAPAROSCOPIC RADICAL PROSTATECTOMY BILATERAL PELVIC LYMPHADENECTOMY  Surgeon: Surgeon(s) and Role:     * Shabbir Gerard MD - Primary     * Chuy Echols MD - Resident - Assisting     * Lizette Sloan MD - Resident - Assisting     * Vu Justin MD - Resident - Assisting  Anesthesia: General   Estimated Blood Loss: 100 mL, 18 Fr remy    Drains: Miguelangel-Torres  Specimens:   ID Type Source Tests Collected by Time Destination   1 : Periprostatic fat Tissue Other SURGICAL PATHOLOGY EXAM Shabbir Gerard MD 5/9/2023  6:15 PM    2 : Right Pelvic Lymph nodes Tissue Lymph Node(s), Pelvis, Right SURGICAL PATHOLOGY EXAM Shabbir Gerard MD 5/9/2023  8:56 PM    3 : Left Pevic lymph nodes Tissue Lymph Node(s), Pelvis, Left SURGICAL PATHOLOGY EXAM Shabbir Gerard MD 5/9/2023  9:14 PM    4 : Posterior bladder neck margin Tissue Other SURGICAL PATHOLOGY EXAM Shabbir Gerard MD 5/9/2023  9:16 PM    5 : Prostate & seminal vessicle Tissue Other SURGICAL PATHOLOGY EXAM Shabbir Gerard MD 5/9/2023 10:05 PM      Findings:   ~90 g prostate with large median lobe; visually negative margins; left sided nerve sparing; watertight vesicourethral anastosis.  Complications: None.  Implants: * No implants in log *    Admit to Urology for routine post-op cares    Chuy Echols MD  Urology, PGY-5  (p) [phone]

## 2023-05-10 NOTE — PROGRESS NOTES
"Urology  Progress Note    NAEO, AF/VSS  Pain well controlled on PRN's  Tolerating clears - no n/v  No flatus  Not ambulated  Remy in place    Exam  /75   Pulse 81   Temp 98.1  F (36.7  C) (Temporal)   Resp 11   Ht 1.88 m (6' 2\")   Wt 93 kg (205 lb 0.4 oz)   SpO2 96%   BMI 26.32 kg/m    No acute distress  Unlabored breathing  Abdomen soft, nontender, nondistended. Incisions c/d/i, dressed with Primapores, minimal strikethrough.   ANAMIKA serosanguinous  Remy with clear yellow urine in tubing    UOP --/775  ANAMIKA --/90    Labs  Recent Labs   Lab Test 05/09/23  2325 05/02/23  1650 12/29/22  0853 06/07/17  1609 04/14/15  1737   WBC  --  8.1  --   --   --    HGB 13.9 15.0  --   --  15.6   CR 1.22* 1.07 1.18*   < > 1.08    < > = values in this interval not displayed.      AM labs pending    Assessment/Plan  62 year old y/o male POD#1 s/p RALP and bilateral LAD for prostate cancer.     Changes today:  - ANAMIKA creatinine; if serum likely ANAMIKA removed  - ADAT  - ambulate  - likely discharge later today if tolerating diet, pain controlled, ambulating, remy to stay     Neuro: Tylenol, oxy/dilaudid, Toradol for pain control  CV: CTM  Pulm: incentive spirometry while awake  FEN/GI: CLD, ADAT. MIVF @ 100/hr. Bowel regimen.   Endo: LIN  : Macrobid for perioperative ppx. Mild DAE, CTM. Catheter in place; to remain in place at discharge, ANAMIKA in place; ANAMIKA creatinine pending, likely ANAMIKA out later today if creatinine serum   Heme/ID: AM labs pending.   Activity: up ad addie.  PPx: SCDs. SQH to start today if Hgb stable unless ambulating       Seen and examined with the chief resident. Will discuss with Dr. Gerard.    Lizette Sloan MD, PGY-2  Urology Resident     Contacting the Urology Team     Please use the following job codes to reach the Urology Team. Note that you must use an in house phone and that job codes cannot receive text pages.     On weekdays, dial [phone] (or star-star-star 777 on the new "[employer] telephones) then [phone] to " reach the Adult Urology resident or PA on call    On weekdays, dial [phone] (or star-star-star 777 on the new [employer] telephones) then [phone] to reach the Pediatric Urology resident    On weeknights and weekends, dial [phone] (or star-star-star 777 on the new [employer] telephones) then [phone] to reach the Urology resident on call (for both Adult and Pediatrics)

## 2023-05-10 NOTE — ANESTHESIA POSTPROCEDURE EVALUATION
Patient: Dakota Myers    Procedure: Procedure(s):  ROBOTIC ASSISTED LAPAROSCOPIC RADICAL PROSTATECTOMY BILATERAL PELVIC LYMPHADENECTOMY       Anesthesia Type:  General    Note:  Disposition: Admission   Postop Pain Control: Uneventful            Sign Out: Well controlled pain   PONV:    Neuro/Psych: Uneventful            Sign Out: Acceptable/Baseline neuro status   Airway/Respiratory: Uneventful            Sign Out: Acceptable/Baseline resp. status   CV/Hemodynamics: Uneventful            Sign Out: Acceptable CV status; No obvious hypovolemia; No obvious fluid overload   Other NRE: NONE   DID A NON-ROUTINE EVENT OCCUR?            Last vitals:  Vitals Value Taken Time   /89 05/10/23 0000   Temp 36.7  C (98  F) 05/10/23 0000   Pulse 73 05/10/23 0009   Resp 8 05/10/23 0009   SpO2 97 % 05/10/23 0009   Vitals shown include unvalidated device data.    Electronically Signed By: Esme Davis MD  May 10, 2023  12:10 AM

## 2023-05-10 NOTE — OP NOTE
OPERATIVE REPORT    DATE OF PROCEDURE: 05/09/23    PRE-PROCEDURE DIAGNOSIS: Prostate cancer  POST-PROCEDURE DIAGNOSIS: Prostate cancer    PROCEDURES PERFORMED:   1) Robotic-assisted laparoscopic radical prostatectomy  2) Bilateral pelvic lymphadenectomy    SURGEON: Shabbir Gerard MD - Present for the entire procedure  RESIDENTS: Chuy Echols MD, PGY-5; Lizette Sloan MD, PGY-2; Vu Justin MD, PGY-1  MEDICAL STUDENT: Medhat Carty MS4    ANESTHESIA: General with endotracheal intubation.     INDICATIONS FOR PROCEDURE: Dakota Myers  is a 62 year-old gentleman with a history of Luz Elena 4+3 = 7 adenocarcinoma of the prostate. He has been counseled regarding treatment options and presents to undergo surgery.     SIGNIFICANT FINDINGS: Prostate and seminal vesicles removed without complication and grossly negative. Lymph nodes grossly negative. Water-tight anastomosis. Left-sided nerve sparing.     SPECIMENS:    1) Lovely-prostatic fat  2) Prostate and seminal vesicles  3) Left pelvic lymph nodes  4) Right pelvic lymph nodes  5) Posterior bladder neck margin    ESTIMATED BLOOD LOSS: 100 mL    DESCRIPTION OF PROCEDURE: After the patient was brought to the operating room and induction of general anesthesia, he was prepped and draped in the supine position.  All pressure points were padded.  He was prepped and draped in the usual sterile fashion for transperitoneal pelvic laparoscopy.    Initial access was obtained using a 1.5 cm curvolinear transverse supraumbilical incision through which we deployed a Veress needle to obtain pneumoperitoneum to a pressure of 20 mmHg.  Subsequently we placed laparoscopic and robotic ports so that there was a total of three robotic 8 mm ports, one 12 mm Air seal port. Lobito-Rigo device was used to preplace 0-Vicryl fascial stitch at the airseal port. Pressure was reduced to 15 mmHg. At this point, we docked the robot. We then dropped the bladder from the anterior abdominal wall to  enter the retropubic space of Retzius. This was made more challenging on the right sided due to presence of significant adhesions and scar tissue related to the prior laparoscopic right inguinal hernia repair with mesh. The pubic arch was defined all the way around to the anterior surface of the prostate and the anterior surface was defatted to expose the prostatovesical junction. This was sent off to pathology. Subsequently, the endopelvic fascia was incised laterally from the base to the apex.     Next, we divided the anterior bladder neck to enter the lumen of the bladder neck. We incised the posterior bladder neck and entered the retrovesical space. The patient had a very large median lobe. We used the robotic tenaculum for additional retraction. The dissection was deepened to identify the vasa deferentia and the seminal vesicles on either side.  The vas was freed up and divided and the seminal vesicles were also freed up from the surrounding tissues.  We incised the posterior layer of Denonvilliers fascia to enter the prerectal space. We performed left-sided nerve sparing with high release. Both the lateral pedicles were now sequentially clipped and divided. The right was not spared as he had right sided unfavorable intermediate risk disease with capsular bulging noted on pre-operative MRI.  After the posterior dissection was completed, the anterior dorsal venous complex was divided and then the membranous urethra was divided and the rectourethralis was also divided as well.  The prostate was then completely freed and placed in the EndoCatch bag. We identified bilateral ureteral orifices which appeared intact. A posterior bladder neck margin was sent for permanent pathology.     At this point, we noted some adhesions of the sigmoid colon which were taken down sharply. We performed bilateral pelvic lymph node dissection.  All the lymphoid tissue in the space was sent to pathology separately as left and right  pelvic lymph nodes. We used a 3-0 V-loc stitch to over sew the deep vein complex. Resection bed was then irrigated and hemostasis was obtained. We performed a Antwon stitch with a 3-0 V-lock suture to approximate the rectourethralis to the posterior detrusor. Next, we performed a running urethrovesical anastomosis using two combined 3-0 V-Lock sutures starting posteriorly and coming up anteriorly on either side in a clock-face fashion. After completing the anastomosis, an indwelling 18-Romansh Hill catheter was placed with 15 mL water instilled into the balloon and the anastomosis was found to be completely watertight after 100 cc was instilled into the bladder.     At this point, a 19 Fr Donn drain was placed via the right lateral-most port site and secured to skin with a nylon drain stitch. Laparoscopic exit was completed in the usual fashion without any incidents or complications. Prostate and seminal vesicles were removed through the camera port and the fascia of the incision was then closed with a 0-Vicryl suture in running fashion. The preplaced fascial vicryl stitch at the Airseal port was tied down. The 8 mm port skin incisions were closed with 4-0 Monocryl sutures. 0.25% marcaine was injected at all incision sites prior to closure. Primapore island dressings were applied over each incision after closure.      The patient was awakened, extubated, and taken to the recovery room in stable condition. He tolerated the procedure well. There were no immediate complications. The estimated blood loss was 100 mL. Sponge, needle, and instrument counts were correct as reported to me.     Chuy Echols MD  Urology, PGY-5  (p) [phone]

## 2023-05-10 NOTE — PLAN OF CARE
Goal Outcome Evaluation:      Plan of Care Reviewed With: patient    Overall Patient Progress: improving    POD 0-1 s/p Robotic-assisted laparoscopic radical prostatectomy, Bilateral pelvic lymphadenectomy.  Arrived from PACU at 0030.  Alert, oriented, AVSS. Sat and stood at edge of bed, tolerated well.  Tolerating clear liquids with no nausea.  Abdominal and catheter-related discomfort managed with 5mg oxycodone, tylenol, toradol, cold packs.  Remy with adequate UOP, urine red initially and now aiyana.  Small amt bloody drainage from urethra, securing device was pulling tightly and was replaced.  Lap site dressings with scant dried drainage.    PLAN: Possible discharge today, with remy, if meeting post op goals.

## 2023-05-10 NOTE — CARE PLAN
Admitted/transferred from: PACU  2 RN full skin assessment completed by Anna Richard, RN and Janelle KANG RN.  Skin assessment finding: skin intact, no problems.  Noted: Hill cath, PIV, ANAMIKA, 4 lap sites with dressings.        Will continue to monitor.

## 2023-05-11 ENCOUNTER — PATIENT OUTREACH (OUTPATIENT)
Dept: CARE COORDINATION | Facility: CLINIC | Age: 63
End: 2023-05-11

## 2023-05-11 NOTE — LETTER
[facility] CARE COORDINATION  [address] [facility] [address]  [place] [address]    May 11, 2023    Dakota Myers  [address] [facility] [address]      Dear Dakota,    I am a  clinic care coordinator who works with Jagruti Ahumada MD with the [facility]. I wanted to introduce myself and provide you with my contact information for you to be able to call me with any questions or concerns. I wanted to thank you for spending the time to talk with me.  Below is a description of clinic care coordination and how I can further assist you.       The clinic care coordination team is made up of a registered nurse, , financial resource worker and community health worker who understand the health care system. The goal of clinic care coordination is to help you manage your health and improve access to the health care system. Our team works alongside your provider to assist you in determining your health and social needs. We can help you obtain health care and community resources, providing you with necessary information and education. We can work with you through any barriers and develop a care plan that helps coordinate and strengthen the communication between you and your care team.  Our services are voluntary and are offered without charge to you personally.    Please feel free to contact me with any questions or concerns regarding care coordination and what we can offer.      We are focused on providing you with the highest-quality healthcare experience possible.    Sincerely,     Marla Soares RN, BSN, CPHN, CM  [facility] Management  [facility]   Phone: [phone]  Email: [email or web address]           
well appearing

## 2023-05-12 ENCOUNTER — TELEPHONE (OUTPATIENT)
Dept: UROLOGY | Facility: CLINIC | Age: 63
End: 2023-05-12

## 2023-05-12 ENCOUNTER — PATIENT OUTREACH (OUTPATIENT)
Dept: UROLOGY | Facility: CLINIC | Age: 63
End: 2023-05-12

## 2023-05-12 LAB
PATH REPORT.COMMENTS IMP SPEC: ABNORMAL
PATH REPORT.COMMENTS IMP SPEC: ABNORMAL
PATH REPORT.COMMENTS IMP SPEC: YES
PATH REPORT.FINAL DX SPEC: ABNORMAL
PATH REPORT.GROSS SPEC: ABNORMAL
PATH REPORT.MICROSCOPIC SPEC OTHER STN: ABNORMAL
PATH REPORT.RELEVANT HX SPEC: ABNORMAL
PATHOLOGY SYNOPTIC REPORT: ABNORMAL
PHOTO IMAGE: ABNORMAL

## 2023-05-12 NOTE — TELEPHONE ENCOUNTER
Health Call Center    Phone Message    May a detailed message be left on voicemail: yes     Reason for Call: Medication Refill Request    Has the patient contacted the pharmacy for the refill? Yes   Name of medication being requested: oxyCODONE (ROXICODONE) 5 MG tablet  Provider who prescribed the medication: Lakisha Argueta    Pharmacy:   Date medication is needed: [facility] [address] [place] [facility] [place], [place] - [address]      Patient called stating that he had prostate removal surgery on 05/09 and he has 5 Oxycodone left and states he was looking at his after visit summary and it states he can't get any refills over the weekend and he's afraid he might need some since he's taking 1 every 5/6 hours. States he might not need them but he just wants to be safe in regards to his pain. Please contact patient in regards to this message. Thank you      Action Taken: Message routed to:  [facility] ([facility] Urology    Travel Screening: Not Applicable

## 2023-05-12 NOTE — TELEPHONE ENCOUNTER
"Molly Melo RN Bratsch, Angie J, RN  Cc: Shabbir Gerard MD  Caller: Unspecified (Today, 11:03 AM)  Hello team-     I reached out to him for a general update. I expressed to him that at this time, we will hold off on any refills. He's stating that his pain is more \"body aches.\"  Reiterated education provided about tylenol and ibuprofen during the day and weaning off narcs. He expressed understanding.     Thanks   Molly Melo RN     "

## 2023-05-12 NOTE — TELEPHONE ENCOUNTER
Spoke to pt. Pt reports he feels that he is healing well. He had his first bowel movement this morning. Urine is draining well. Incision site seems ok. Having general achiness and soreness in stomach/gut area, but seems to be improving. Pt states that he is taking oxycodone every 5-6 hours. Also taking tylenol and ibuprofen. Taking taking 2 tylenol with the oxycodone. He is also taking ibuprofen in between. Currently pain is rated about 6-6.5/10. Able to eat and drink ok. He states that he is taking 2 at night time to help him sleep due to waking up to achiness. Pt has 5 tablets left and requests to have refill sent to his preferred pharmacy. Discussed using tylenol and ibuprofen as main pain control regimen and to schedule it out, then advised to use oxycodone for breakthrough pain at this time. Pt verbalized understanding and states that he will try to do this.     Tana Schneider, MSN RN

## 2023-05-12 NOTE — TELEPHONE ENCOUNTER
"Writer reached out to pt due to his request for a refill on oxycodone.     Pt states he is trying to do better with a schedule of tylenol and ibuprofen and weaning off of oxycodone. Writer informed him that he should not be using oxy as a sleep aid. Pt expressed understanding.     Pt stated that his pain feel like an \"achiness\" and overall feels like hes doing well.     Writer informed him that we will hold off on a refill of his pain medication at this time. Pt expressed understanding.     Will continue to follow as needed.   "

## 2023-05-17 ENCOUNTER — OFFICE VISIT (OUTPATIENT)
Dept: UROLOGY | Facility: CLINIC | Age: 63
End: 2023-05-17
Payer: COMMERCIAL

## 2023-05-17 DIAGNOSIS — C61 PROSTATE CANCER (H): Primary | ICD-10-CM

## 2023-05-17 PROCEDURE — 99024 POSTOP FOLLOW-UP VISIT: CPT

## 2023-05-17 NOTE — PROGRESS NOTES
Dakota Myers comes into clinic today at the request of Dr Gerard with the diagnosis of prostate cancer for a catheter removal and wound check.      Removal:  16 Fr straight tipped latex remy catheter removed from urethral meatus without difficulty after removing 14 mL of fluid from the balloon.      Patient did tolerate procedure well.       Dr. Gerard met with the pt to discuss path report. Provider will place referral for radiation oncology and order 5mg dose of Cilalis to send to preferred pharmacy.     Patient instructed as to where to call or go for signs of urinary retention. Pt expressed understanding.     This service provided today was under the direct supervision of Dr. Gerard, who was available if needed.    Molly Melo RN   5/17/2023  2:26 PM

## 2023-05-24 DIAGNOSIS — C61 PROSTATE CANCER (H): Primary | ICD-10-CM

## 2023-05-24 RX ORDER — TADALAFIL 5 MG/1
5 TABLET ORAL EVERY 24 HOURS
Qty: 30 TABLET | Refills: 5 | Status: SHIPPED | OUTPATIENT
Start: 2023-05-24 | End: 2023-11-29

## 2023-05-25 ENCOUNTER — PATIENT OUTREACH (OUTPATIENT)
Dept: ONCOLOGY | Facility: CLINIC | Age: 63
End: 2023-05-25

## 2023-05-25 NOTE — PROGRESS NOTES
[facility]: Cancer Care                                                                   Hem/Onc  referral for Radiation Oncology services reviewed  Referred By 05/24/2023  7:58 PM  Molly Melo, RN  Referred To   Shabbir Gerard MD  [facility]    [facility]      [address]    [facility] [address]   Phone: [phone]   Fax: [phone]    Diagnoses: Prostate cancer (H)   Order: Radiation Therapy Referral    Radiation Oncology    [facility] - [place]: [phone]     ASSESSMENT      Clinical History (per Nurse review of records provided):    63 year old male patient with newly diagnosed prostate cancer referred to Rad onc by urologist post radical prostatectomy and staging imaging.    [address]  [facility] [address]  Payor: [employer] / Plan: [employer] OPEN ACCESS / Product Type: HMO /   PCP: Jagruti Ahumada    Records Location: [facility]     Pertinent pathology report Radical prostatectomy May 9, 2023 -- BOOKMARKED    Pertinent imaging -- BOOKMARKED    Referring provider note(s)-- BOOKMARKED    INTERVENTION(S)                                                      OUTGOING CALL to pt: Introduced my role as nurse navigator with [facility] Hematology/Oncology/Radiation Oncology depts and that we have recd the referral to rad onc from Dr Gerard.  Identity verified.  Pt confirms they are aware of the referral and ready to schedule, denied questions for NN.  Warm-transferred patient to intake rep at number below to schedule the consultation    PLAN                                                      Radiation Oncology consult at [facility]     Daya Ly, RN, BSN, OCN  Hematology/Oncology New Patient Nurse Navigator   [facility]  [phone]

## 2023-06-01 ENCOUNTER — PATIENT OUTREACH (OUTPATIENT)
Dept: UROLOGY | Facility: CLINIC | Age: 63
End: 2023-06-01

## 2023-06-06 ENCOUNTER — MEDICAL CORRESPONDENCE (OUTPATIENT)
Dept: HEALTH INFORMATION MANAGEMENT | Facility: CLINIC | Age: 63
End: 2023-06-06

## 2023-06-20 ENCOUNTER — OFFICE VISIT (OUTPATIENT)
Dept: RADIATION ONCOLOGY | Facility: CLINIC | Age: 63
End: 2023-06-20
Attending: UROLOGY
Payer: COMMERCIAL

## 2023-06-20 VITALS
HEART RATE: 82 BPM | BODY MASS INDEX: 26.96 KG/M2 | SYSTOLIC BLOOD PRESSURE: 157 MMHG | WEIGHT: 210 LBS | DIASTOLIC BLOOD PRESSURE: 92 MMHG

## 2023-06-20 DIAGNOSIS — C61 PROSTATE CANCER (H): ICD-10-CM

## 2023-06-20 PROCEDURE — G0463 HOSPITAL OUTPT CLINIC VISIT: HCPCS | Performed by: RADIOLOGY

## 2023-06-20 PROCEDURE — 99204 OFFICE O/P NEW MOD 45 MIN: CPT | Mod: GC | Performed by: RADIOLOGY

## 2023-06-20 ASSESSMENT — ENCOUNTER SYMPTOMS
CHILLS: 0
WEIGHT LOSS: 0
NAUSEA: 0
NECK PAIN: 0
FALLS: 0
BACK PAIN: 0
HEARTBURN: 0
NERVOUS/ANXIOUS: 0
HEADACHES: 0
SORE THROAT: 0
SHORTNESS OF BREATH: 0
WHEEZING: 0
DEPRESSION: 0
COUGH: 0
CONSTIPATION: 0
FEVER: 0
DIARRHEA: 0
DIZZINESS: 0
VOMITING: 0
FREQUENCY: 0
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
## 2023-06-20 NOTE — PROGRESS NOTES
HPI  INITIAL PATIENT ASSESSMENT    Diagnosis: prostate cancer    Prior radiation therapy: None    Prior chemotherapy: None    Prior hormonal therapy:No    Pain Eval:  Denies    Psychosocial  Living arrangements: wife  Fall Risk: independent   referral needs: Not needed    Advanced Directive: No  Implantable Cardiac Device? No    Onset of menarche:   LMP: No LMP for male patient.  Onset of menopause:   Abnormal vaginal bleeding/discharge:   Are you pregnant?   Reproductive note:     Nurse face-to-face time: Level 4:  15 min face to face time    Review of Systems   Constitutional: Negative for chills, fever, malaise/fatigue and weight loss.   HENT: Negative for congestion, hearing loss, sore throat and tinnitus.    Respiratory: Negative for cough, shortness of breath and wheezing.    Cardiovascular: Negative for chest pain and leg swelling.   Gastrointestinal: Negative for constipation, diarrhea, heartburn, nausea and vomiting.   Genitourinary: Negative for frequency and urgency.   Musculoskeletal: Negative for back pain, falls and neck pain.   Skin: Negative for rash.   Neurological: Negative for dizziness and headaches.   Endo/Heme/Allergies: Negative for environmental allergies.   Psychiatric/Behavioral: Negative for depression. The patient is not nervous/anxious.

## 2023-07-03 ENCOUNTER — LAB (OUTPATIENT)
Dept: LAB | Facility: CLINIC | Age: 63
End: 2023-07-03
Payer: COMMERCIAL

## 2023-07-03 DIAGNOSIS — C61 PROSTATE CANCER (H): ICD-10-CM

## 2023-07-03 LAB — PSA SERPL DL<=0.01 NG/ML-MCNC: 0.37 NG/ML (ref 0–4.5)

## 2023-07-03 PROCEDURE — 84403 ASSAY OF TOTAL TESTOSTERONE: CPT | Performed by: RADIOLOGY

## 2023-07-03 PROCEDURE — 36415 COLL VENOUS BLD VENIPUNCTURE: CPT | Performed by: PATHOLOGY

## 2023-07-03 PROCEDURE — 99000 SPECIMEN HANDLING OFFICE-LAB: CPT | Performed by: PATHOLOGY

## 2023-07-03 PROCEDURE — 84153 ASSAY OF PSA TOTAL: CPT | Performed by: PATHOLOGY

## 2023-07-06 LAB — TESTOST SERPL-MCNC: 249 NG/DL (ref 240–950)

## 2023-07-08 NOTE — PROGRESS NOTES
[facility] [place]  Radiation Oncology Follow-up Note  2023    Dakota Myers   MRN: [id number]  : 1960     SUBJECTIVE:   The patient is a 63 year old man who was found to have an elevated PSA (21.90, 2022) with a prostate biopsy confirmation of adenocarcinoma of the prostate.     MRI prostate (23) showed a 91.4 gm prostate with PI-RADS 5 lesion located at the apex and base peripheral zone at the 5 to 8 o'clock position with high suspicion of EPE without suspicious adenopathy.     MRI fusion TRUS biopsy (3-2-23, [id number]) showed prostate adenocarcinoma in 11/14 cores. The highest grade was 3 and highest Hominy score was 7 (4+3). There was perineural invasion. The tumor exhibited morphologic features overlapping with those seen in PIN-like ductal adenocarcinoma.      Staging CT abdomen/pelvis (3-22-23) and Bone scan (3-22-23) showed no evidence of metastatic disease.     The patient underwent robotic assisted laparoscopic radical prostatectomy and bilateral pelvic lymphadenectomy (23). Surgical pathology ([id number]) showed prostatic acinar adenocarcinoma and ductal adenocarcinoma, grade 3, Hominy 7 (4+3), with cribriform glands, with focal EPE in the right posterior gland, with PNI, without bladder neck invasion, seminal vesicle invasion or LVSI. The right apical and left apical margins were positive. All lymph nodes were negative 0/5. His stage was [id number].     2023, initial consultation with Dr. Lockhrat: Treatment options including observation or adjuvant radiation were discussed and Dr. Lockhart recommended checking PSA following the patient's recovery from surgery.     7/3/2023, PSA: 0.37    On interview, the patient reports continued incontinence, and uses daily light pads.  The patient also reports dripping after urination.  Both of these symptoms have been steadily improving.  The patient also reports that he is continuing his daily Cialis. The  patient expressed his disappointment about the rising PSA.  Of note, the patient reported having sexual intercourse 24 hours (eg within the 48 hr window prior to his PSA) prior to his PSA lab draw.       OBJECTIVE:     VITALS: BP (!) 147/87   Pulse 75   GEN: Appears well, alert, oriented, and in no acute distress  : deferred    LABS AND IMAGING: As above     IMPRESSION: Adenocarcinoma of the prostate, pT3a pN0 M0 R1, [place] 4+3=7, s/p radical prostatectomy and bilateral pelvic lymphadenectomy, PSA 0.37     RECOMMENDATIONS:    We will repeat the PSA to confirm the rise  Afterwards, plan to schedule ADT 1 week after the PSA lab draw.  In addition, plan for CT simulation next week as well.    Presuming that the next PSA lab draw will remain elevated, given a rising PSA in the setting of a radical prostatectomy with positive margins, we recommend salvage radiotherapy.  We recommend 6 months of adjuvant ADT.    Possible side effects of radiation include, but are not limited to, urinary symptoms including frequency, urgency, intermittency, dysuria, and incontinence, loose stools or diarrhea, erectile dysfunction, and radiation injury to the rectum or bladder resulting in bleeding in the stool or urine, or bowel obstruction or perforation. I also discussed ADT and its associated side effects, which include (but are not limited to) hot flashes, decreased bone mineral density, weight gain, muscle loss, loss of libido and erectile function, gynecomastia, emotional lability, and effects on hypertension, cholesterol, coronary artery disease and diabetes that may put him at increased risk of cardiac events..     The risks, benefits, alternatives, and logistics to radiation therapy were discussed in detail. He is aware that the side effects of radiation therapy may be severe and permanent, although we expect that such risks would be low and that they are outweighed by the benefit of treatment. He was given the opportunity to  ask questions, which were answered.  Informed consent was obtained.    Patient was seen and discussed with my attending physician, Dr. Lockhart.    Owen Thomas MD, MS PGY-2  PGY-2 Radiation Oncology  Department of Radiation Oncology  [facility]  Phone: [phone]      I saw the patient with the resident.  I agree with the resident's note and plan of care.      MINAL Lockhart M.D.  Department of Radiation Oncology  [facility]

## 2023-07-11 ENCOUNTER — OFFICE VISIT (OUTPATIENT)
Dept: RADIATION ONCOLOGY | Facility: CLINIC | Age: 63
End: 2023-07-11
Attending: UROLOGY
Payer: COMMERCIAL

## 2023-07-11 VITALS — SYSTOLIC BLOOD PRESSURE: 147 MMHG | DIASTOLIC BLOOD PRESSURE: 87 MMHG | HEART RATE: 75 BPM

## 2023-07-11 DIAGNOSIS — C61 PROSTATE CANCER (H): Primary | ICD-10-CM

## 2023-07-11 PROCEDURE — G0463 HOSPITAL OUTPT CLINIC VISIT: HCPCS

## 2023-07-11 PROCEDURE — 99214 OFFICE O/P EST MOD 30 MIN: CPT | Mod: GC | Performed by: RADIOLOGY

## 2023-07-11 NOTE — PROGRESS NOTES
FOLLOW-UP VISIT    Patient Name: Dakota Myers      : 1960     Age: 63 year old        ______________________________________________________________________________     Chief Complaint   Patient presents with     Prostate Cancer     Fup to discuss rad therapy for prostate cancer     There were no vitals taken for this visit.     Date Radiation Completed: none yet    Pain  Denies    Labs  Other Labs: Yes: PSA 0.37                            Test  249    Imaging  None        PSA:   Prostate Specific Antigen Screen   Date Value Ref Range Status   2022 21.90 (H) 0.00 - 4.50 ng/mL Final     PSA Tumor Marker   Date Value Ref Range Status   2023 0.37 0.00 - 4.50 ng/mL Final     Testosterone Level:   Testosterone Total   Date Value Ref Range Status   2023 249 240 - 950 ng/dL Final       On-Study AUA Symptom Score (PQ)       Diarrhea: 0- None    Constipation: 0- None      Other Appointments:     MD Name:  Appointment Date:    MD Name: Appointment Date:   MD Name: Appointment Date:   Other Appointment Notes:     Residual Radiation side effect:      Additional Instructions:     Nurse face-to-face time: Level 3:  10 min face to face time

## 2023-07-11 NOTE — LETTER
2023         RE: Dakota Myers  [address] [facility] [address]        Dear Colleague,    Thank you for referring your patient, Dakota Myers, to the [facility] RADIATION ONCOLOGY. Please see a copy of my visit note below.    [facility], [place]  Radiation Oncology Follow-up Note  2023    Dakota Myers   MRN: [id number]  : 1960     SUBJECTIVE:   The patient is a 63 year old man who was found to have an elevated PSA (21.90, 2022) with a prostate biopsy confirmation of adenocarcinoma of the prostate.     MRI prostate (23) showed a 91.4 gm prostate with PI-RADS 5 lesion located at the apex and base peripheral zone at the 5 to 8 o'clock position with high suspicion of EPE without suspicious adenopathy.     MRI fusion TRUS biopsy (3-2-23, [id number]) showed prostate adenocarcinoma in 11/14 cores. The highest grade was 3 and highest Luz Elena score was 7 (4+3). There was perineural invasion. The tumor exhibited morphologic features overlapping with those seen in PIN-like ductal adenocarcinoma.      Staging CT abdomen/pelvis (3-22-23) and Bone scan (3-22-23) showed no evidence of metastatic disease.     The patient underwent robotic assisted laparoscopic radical prostatectomy and bilateral pelvic lymphadenectomy (23). Surgical pathology ([id number]) showed prostatic acinar adenocarcinoma and ductal adenocarcinoma, grade 3, [place] 7 (4+3), with cribriform glands, with focal EPE in the right posterior gland, with PNI, without bladder neck invasion, seminal vesicle invasion or LVSI. The right apical and left apical margins were positive. All lymph nodes were negative 0/5. His stage was [id number].     2023, initial consultation with Dr. Lockhart: Treatment options including observation or adjuvant radiation were discussed and Dr. Lockhart recommended checking PSA following the patient's recovery from surgery.     7/3/2023, PSA: 0.37    On  interview, the patient reports continued incontinence, and uses daily light pads.  The patient also reports dripping after urination.  Both of these symptoms have been steadily improving.  The patient also reports that he is continuing his daily Cialis. The patient expressed his disappointment about the rising PSA.  Of note, the patient reported having sexual intercourse 24 hours (eg within the 48 hr window prior to his PSA) prior to his PSA lab draw.       OBJECTIVE:     VITALS: BP (!) 147/87   Pulse 75   GEN: Appears well, alert, oriented, and in no acute distress  : deferred    LABS AND IMAGING: As above     IMPRESSION: Adenocarcinoma of the prostate, pT3a pN0 M0 R1, [place] 4+3=7, s/p radical prostatectomy and bilateral pelvic lymphadenectomy, PSA 0.37     RECOMMENDATIONS:    We will repeat the PSA to confirm the rise  Afterwards, plan to schedule ADT 1 week after the PSA lab draw.  In addition, plan for CT simulation next week as well.    Presuming that the next PSA lab draw will remain elevated, given a rising PSA in the setting of a radical prostatectomy with positive margins, we recommend salvage radiotherapy.  We recommend 6 months of adjuvant ADT.    Possible side effects of radiation include, but are not limited to, urinary symptoms including frequency, urgency, intermittency, dysuria, and incontinence, loose stools or diarrhea, erectile dysfunction, and radiation injury to the rectum or bladder resulting in bleeding in the stool or urine, or bowel obstruction or perforation. I also discussed ADT and its associated side effects, which include (but are not limited to) hot flashes, decreased bone mineral density, weight gain, muscle loss, loss of libido and erectile function, gynecomastia, emotional lability, and effects on hypertension, cholesterol, coronary artery disease and diabetes that may put him at increased risk of cardiac events..     The risks, benefits, alternatives, and logistics to  radiation therapy were discussed in detail. He is aware that the side effects of radiation therapy may be severe and permanent, although we expect that such risks would be low and that they are outweighed by the benefit of treatment. He was given the opportunity to ask questions, which were answered.  Informed consent was obtained.    Patient was seen and discussed with my attending physician, Dr. Lockhart.    Owen Thomas MD, MS PGY-2  PGY-2 Radiation Oncology  Department of Radiation Oncology  [facility]  Phone: [phone]      I saw the patient with the resident.  I agree with the resident's note and plan of care.      MINAL Lockhart M.D.  Department of Radiation Oncology  [facility]        FOLLOW-UP VISIT    Patient Name: Dakota Myers      : 1960     Age: 63 year old        ______________________________________________________________________________     Chief Complaint   Patient presents with    Prostate Cancer     Fup to discuss rad therapy for prostate cancer     There were no vitals taken for this visit.     Date Radiation Completed: none yet    Pain  Denies    Labs  Other Labs: Yes: PSA 0.37                            Test  249    Imaging  None        PSA:   Prostate Specific Antigen Screen   Date Value Ref Range Status   2022 21.90 (H) 0.00 - 4.50 ng/mL Final     PSA Tumor Marker   Date Value Ref Range Status   2023 0.37 0.00 - 4.50 ng/mL Final     Testosterone Level:   Testosterone Total   Date Value Ref Range Status   2023 249 240 - 950 ng/dL Final       On-Study AUA Symptom Score (PQ)       Diarrhea: 0- None    Constipation: 0- None      Other Appointments:     MD Name:  Appointment Date:    MD Name: Appointment Date:   MD Name: Appointment Date:   Other Appointment Notes:     Residual Radiation side effect:      Additional Instructions:     Nurse face-to-face time: Level 3:  10 min face to face time            Again, thank  you for allowing me to participate in the care of your patient.        Sincerely,        Joseph Lockhart MD

## 2023-07-13 ENCOUNTER — LAB (OUTPATIENT)
Dept: LAB | Facility: CLINIC | Age: 63
End: 2023-07-13
Payer: COMMERCIAL

## 2023-07-13 DIAGNOSIS — C61 PROSTATE CANCER (H): ICD-10-CM

## 2023-07-13 LAB — PSA SERPL DL<=0.01 NG/ML-MCNC: 0.42 NG/ML (ref 0–4.5)

## 2023-07-13 PROCEDURE — 36415 COLL VENOUS BLD VENIPUNCTURE: CPT | Performed by: PATHOLOGY

## 2023-07-13 PROCEDURE — 84153 ASSAY OF PSA TOTAL: CPT | Performed by: PATHOLOGY

## 2023-07-16 ENCOUNTER — MYC MEDICAL ADVICE (OUTPATIENT)
Dept: FAMILY MEDICINE | Facility: CLINIC | Age: 63
End: 2023-07-16

## 2023-07-16 DIAGNOSIS — F43.22 ACUTE ADJUSTMENT DISORDER WITH ANXIETY: ICD-10-CM

## 2023-07-18 ENCOUNTER — OFFICE VISIT (OUTPATIENT)
Dept: RADIATION ONCOLOGY | Facility: CLINIC | Age: 63
End: 2023-07-18
Attending: UROLOGY
Payer: COMMERCIAL

## 2023-07-18 DIAGNOSIS — C61 PROSTATE CANCER (H): Primary | ICD-10-CM

## 2023-07-18 PROCEDURE — 77334 RADIATION TREATMENT AID(S): CPT | Performed by: RADIOLOGY

## 2023-07-18 PROCEDURE — 77334 RADIATION TREATMENT AID(S): CPT | Mod: 26 | Performed by: RADIOLOGY

## 2023-07-18 RX ORDER — DIAZEPAM 2 MG
2 TABLET ORAL EVERY 12 HOURS PRN
Qty: 10 TABLET | Refills: 0 | Status: SHIPPED | OUTPATIENT
Start: 2023-07-18

## 2023-07-18 NOTE — PROGRESS NOTES
Radiation Therapy Patient Education    Person involved with teaching: Patient    Patient educational needs for self management of treatment-related side effects assessment completed.  [facility] Patient Ed tab contains Patient Learning Assessment    Education Materials Given  Radiation Therapy and You    Educational Topics Discussed  Side effects expected, Nutrition and weight loss and When to call MD/RN    Response To Teaching  Verbalizes understanding    GYN Only  Vaginal Dilator-given and educated: N/A    Referrals sent: None    Chemotherapy?  No

## 2023-07-18 NOTE — PROGRESS NOTES
Simulation Note    Date: 7/18/2023     Patient: Dakota Myers    Diagnosis: Prostate cancer (H)    Type of Simulation:  Salvage     Patient Position: Supine    Patient Immobilization: Custom:  Vac-Loc    Simulation Aid(s): Urethrogram    Image Acquisition: Conventional CT simulation without 4D    Total Dose Planned: 7000 cGy      Dose/fraction:200 cGy    Energy of machine:  10 MV           Type of Radiotherapy Technique: IMRT(Intensity Modulated Radiotherapy)      Continuing Physcis/Dosimetry  and Dose calculations are ordered.  Simple simulations will be done prior to new start and changes in fields.  Weekly on treat visit.      MINAL Lockhart M.D.  Department of Radiation Oncology  [facility]

## 2023-07-18 NOTE — LETTER
7/18/2023         RE: Dakota Myers  [address] [facility] [address]        Dear Colleague,    Thank you for referring your patient, Dakota Myers, to the [facility] RADIATION ONCOLOGY. Please see a copy of my visit note below.    Simulation Note    Date: 7/18/2023     Patient: Dakota Myers    Diagnosis: Prostate cancer (H)    Type of Simulation:  Salvage     Patient Position: Supine    Patient Immobilization: Custom:  Vac-Loc    Simulation Aid(s): Urethrogram    Image Acquisition: Conventional CT simulation without 4D    Total Dose Planned: 7000 cGy      Dose/fraction:200 cGy    Energy of machine:  10 MV           Type of Radiotherapy Technique: IMRT(Intensity Modulated Radiotherapy)      Continuing Physcis/Dosimetry  and Dose calculations are ordered.  Simple simulations will be done prior to new start and changes in fields.  Weekly on treat visit.      MINAL Lockhart M.D.  Department of Radiation Oncology  [facility]        Radiation Therapy Patient Education    Person involved with teaching: Patient    Patient educational needs for self management of treatment-related side effects assessment completed.  [facility] Patient Ed tab contains Patient Learning Assessment    Education Materials Given  Radiation Therapy and You    Educational Topics Discussed  Side effects expected, Nutrition and weight loss and When to call MD/RN    Response To Teaching  Verbalizes understanding    GYN Only  Vaginal Dilator-given and educated: N/A    Referrals sent: None    Chemotherapy?  No          Again, thank you for allowing me to participate in the care of your patient.        Sincerely,        Joseph Lockhart MD

## 2023-07-18 NOTE — TELEPHONE ENCOUNTER
Dr. Ahumada: pended diazepam    Last Written Prescription Date:  5/4/23  Last Fill Quantity: 10,  # refills: 0   Last office visit: 12/29/22 with Brandyn  Future Office Visit:      My prostatectomy went well, but looks like there s still some cancer there. Start radiation treatment on Tuesday (35 treatments over 7 weeks) and hormonal therapy on Wednesday. I still have one Valium left, but my anxiety this weekend has been pretty severe. Wondering if it d be possible to get a refill? 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 I obviously have a lot of medical appts. coming up, but would be happy to try and see you in person if you d prefer. Thank you!     Raisa FONTAINE RN  [facility]

## 2023-07-19 ENCOUNTER — INFUSION THERAPY VISIT (OUTPATIENT)
Dept: INFUSION THERAPY | Facility: CLINIC | Age: 63
End: 2023-07-19
Attending: RADIOLOGY
Payer: COMMERCIAL

## 2023-07-19 VITALS
TEMPERATURE: 98.1 F | DIASTOLIC BLOOD PRESSURE: 85 MMHG | RESPIRATION RATE: 16 BRPM | HEART RATE: 78 BPM | OXYGEN SATURATION: 97 % | SYSTOLIC BLOOD PRESSURE: 144 MMHG

## 2023-07-19 DIAGNOSIS — C61 PROSTATE CANCER (H): Primary | ICD-10-CM

## 2023-07-19 PROCEDURE — 250N000011 HC RX IP 250 OP 636: Mod: JZ | Performed by: RADIOLOGY

## 2023-07-19 PROCEDURE — 96402 CHEMO HORMON ANTINEOPL SQ/IM: CPT

## 2023-07-19 RX ADMIN — LEUPROLIDE ACETATE 22.5 MG: KIT at 14:49

## 2023-07-19 NOTE — TELEPHONE ENCOUNTER
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  Sent to pharmacy.   Dr. Jagruti Ahumada MD / [facility] \"    Writer responded via [employer].    SUNITA QuinonesN, RN-[facility]    "

## 2023-07-19 NOTE — PROGRESS NOTES
Infusion Nursing Note:  Dakota Myers presents today for C1D1 Lupron injection.    Patient seen by provider today: No   present during visit today: Not Applicable.    Note: N/A.      Intravenous Access:  No Intravenous access/labs at this visit.    Treatment Conditions:  Not Applicable.      Post Infusion Assessment:  Patient tolerated injection without incident.  Site patent and intact, free from redness, edema or discomfort.       Discharge Plan:   Discharge instructions reviewed with: Patient.  Patient and/or family verbalized understanding of discharge instructions and all questions answered.  AVS to patient via [employer].    Patient discharged in stable condition accompanied by: self.  Departure Mode: Ambulatory.      Raina Santos RN

## 2023-07-28 ENCOUNTER — APPOINTMENT (OUTPATIENT)
Dept: RADIATION ONCOLOGY | Facility: CLINIC | Age: 63
End: 2023-07-28
Attending: RADIOLOGY
Payer: COMMERCIAL

## 2023-07-31 ENCOUNTER — APPOINTMENT (OUTPATIENT)
Dept: RADIATION ONCOLOGY | Facility: CLINIC | Age: 63
End: 2023-07-31
Attending: RADIOLOGY
Payer: COMMERCIAL

## 2023-07-31 PROCEDURE — 77385 HC IMRT TREATMENT DELIVERY, SIMPLE: CPT | Performed by: RADIOLOGY

## 2023-07-31 PROCEDURE — 77014 PR CT GUIDE FOR PLACEMENT RADIATION THERAPY FIELDS: CPT | Mod: 26 | Performed by: RADIOLOGY

## 2023-07-31 PROCEDURE — 77386 HC IMRT TREATMENT DELIVERY, COMPLEX: CPT | Performed by: RADIOLOGY

## 2023-08-01 ENCOUNTER — APPOINTMENT (OUTPATIENT)
Dept: RADIATION ONCOLOGY | Facility: CLINIC | Age: 63
End: 2023-08-01
Attending: RADIOLOGY
Payer: COMMERCIAL

## 2023-08-01 PROCEDURE — 77014 PR CT GUIDE FOR PLACEMENT RADIATION THERAPY FIELDS: CPT | Mod: 26 | Performed by: RADIOLOGY

## 2023-08-01 PROCEDURE — 77385 HC IMRT TREATMENT DELIVERY, SIMPLE: CPT | Performed by: RADIOLOGY

## 2023-08-01 PROCEDURE — 77386 HC IMRT TREATMENT DELIVERY, COMPLEX: CPT | Performed by: RADIOLOGY

## 2023-08-02 ENCOUNTER — APPOINTMENT (OUTPATIENT)
Dept: RADIATION ONCOLOGY | Facility: CLINIC | Age: 63
End: 2023-08-02
Attending: RADIOLOGY
Payer: COMMERCIAL

## 2023-08-02 PROCEDURE — 77385 HC IMRT TREATMENT DELIVERY, SIMPLE: CPT | Performed by: RADIOLOGY

## 2023-08-02 PROCEDURE — 77386 HC IMRT TREATMENT DELIVERY, COMPLEX: CPT | Performed by: RADIOLOGY

## 2023-08-02 PROCEDURE — 77014 PR CT GUIDE FOR PLACEMENT RADIATION THERAPY FIELDS: CPT | Mod: 26 | Performed by: RADIOLOGY

## 2023-08-03 ENCOUNTER — APPOINTMENT (OUTPATIENT)
Dept: RADIATION ONCOLOGY | Facility: CLINIC | Age: 63
End: 2023-08-03
Attending: RADIOLOGY
Payer: COMMERCIAL

## 2023-08-03 ENCOUNTER — OFFICE VISIT (OUTPATIENT)
Dept: RADIATION ONCOLOGY | Facility: CLINIC | Age: 63
End: 2023-08-03
Attending: UROLOGY
Payer: COMMERCIAL

## 2023-08-03 VITALS
DIASTOLIC BLOOD PRESSURE: 89 MMHG | BODY MASS INDEX: 26.78 KG/M2 | WEIGHT: 208.6 LBS | HEART RATE: 70 BPM | SYSTOLIC BLOOD PRESSURE: 131 MMHG

## 2023-08-03 DIAGNOSIS — C61 PROSTATE CANCER (H): Primary | ICD-10-CM

## 2023-08-03 PROCEDURE — 77386 HC IMRT TREATMENT DELIVERY, COMPLEX: CPT | Performed by: RADIOLOGY

## 2023-08-03 PROCEDURE — 77385 HC IMRT TREATMENT DELIVERY, SIMPLE: CPT | Performed by: RADIOLOGY

## 2023-08-03 NOTE — PROGRESS NOTES
WEEKLY MANAGEMENT NOTE  Radiation Oncology          Patient Name: Dakota Myers  MRN: [id number]       Pelvis Current Dose: 800/7000 cGy Fractions: 4/35          DAILY DOSE:     200  cGy/ day,  5 times/week      DISEASE UNDER TREATMENT: Adenocarcinoma of the prostate, pT3a pN0 M0 R1, [place] 4+3=7, s/p radical prostatectomy and bilateral pelvic lymphadenectomy, PSA 0.42(7/13/2023)     ADT: Lupron, 22.5 mg, 6M     SUBJECTIVE: 74 yo man undergoing salvage radiotherapy for prostate cancer.    CTC V5.0 Toxicity Criteria  Fatigue: Grade 0: No toxicity  Pain Score:  0/10    :   Urinary Frequency/Urgency: Grade 0: No change from baseline  Urinary Incontinence: Grade 0: No change from baseline  Dysuria:Grade 0: No change from baseline  Nocturia: 2 (baseline 2)  Comment:    GI:  Diarrhea:Grade 0  No change over baseline  Proctitis: Grade 0 No symptom  Comment:      OBJECTIVE:/89   Pulse 70   Wt 94.6 kg (208 lb 9.6 oz)   BMI 26.78 kg/m     Wt Readings from Last 2 Encounters:   08/03/23 94.6 kg (208 lb 9.6 oz)   06/20/23 95.3 kg (210 lb)         IMPRESSION: The patient is tolerating the treatment.  The patient set up, dose, and cone beam CT images were reviewed.      PLAN: Continue radiotherapy. May need to re-sim for bladder volume    PAIN MANAGEMENT PLAN: The patient does not require pain management    MINAL Lockhart M.D.  Department of Radiation Oncology  [facility]

## 2023-08-03 NOTE — LETTER
8/3/2023         RE: Dakota Myers  [address] [facility] [address]        Dear Colleague,    Thank you for referring your patient, Dakota Myers, to the [facility] RADIATION ONCOLOGY. Please see a copy of my visit note below.    WEEKLY MANAGEMENT NOTE  Radiation Oncology          Patient Name: Dakota Myers  MRN: [id number]       Pelvis Current Dose: 800/7000 cGy Fractions: 4/35          DAILY DOSE:     200  cGy/ day,  5 times/week      DISEASE UNDER TREATMENT: Adenocarcinoma of the prostate, pT3a pN0 M0 R1, [place] 4+3=7, s/p radical prostatectomy and bilateral pelvic lymphadenectomy, PSA 0.42(7/13/2023)     ADT: Lupron, 22.5 mg, 6M     SUBJECTIVE: 74 yo man undergoing salvage radiotherapy for prostate cancer.    CTC V5.0 Toxicity Criteria  Fatigue: Grade 0: No toxicity  Pain Score:  0/10    :   Urinary Frequency/Urgency: Grade 0: No change from baseline  Urinary Incontinence: Grade 0: No change from baseline  Dysuria:Grade 0: No change from baseline  Nocturia: 2 (baseline 2)  Comment:    GI:  Diarrhea:Grade 0  No change over baseline  Proctitis: Grade 0 No symptom  Comment:      OBJECTIVE:/89   Pulse 70   Wt 94.6 kg (208 lb 9.6 oz)   BMI 26.78 kg/m     Wt Readings from Last 2 Encounters:   08/03/23 94.6 kg (208 lb 9.6 oz)   06/20/23 95.3 kg (210 lb)         IMPRESSION: The patient is tolerating the treatment.  The patient set up, dose, and cone beam CT images were reviewed.      PLAN: Continue radiotherapy. May need to re-sim for bladder volume    PAIN MANAGEMENT PLAN: The patient does not require pain management    MINAL Lockhart M.D.  Department of Radiation Oncology  [facility]

## 2023-08-04 ENCOUNTER — APPOINTMENT (OUTPATIENT)
Dept: RADIATION ONCOLOGY | Facility: CLINIC | Age: 63
End: 2023-08-04
Attending: RADIOLOGY
Payer: COMMERCIAL

## 2023-08-04 PROCEDURE — 77014 PR CT GUIDE FOR PLACEMENT RADIATION THERAPY FIELDS: CPT | Mod: 26 | Performed by: RADIOLOGY

## 2023-08-04 PROCEDURE — 77385 HC IMRT TREATMENT DELIVERY, SIMPLE: CPT | Performed by: RADIOLOGY

## 2023-08-04 PROCEDURE — 77386 HC IMRT TREATMENT DELIVERY, COMPLEX: CPT | Performed by: RADIOLOGY

## 2023-08-04 PROCEDURE — 77336 RADIATION PHYSICS CONSULT: CPT | Performed by: RADIOLOGY

## 2023-08-04 PROCEDURE — 77427 RADIATION TX MANAGEMENT X5: CPT | Performed by: RADIOLOGY

## 2023-08-07 ENCOUNTER — MYC MEDICAL ADVICE (OUTPATIENT)
Dept: UROLOGY | Facility: CLINIC | Age: 63
End: 2023-08-07

## 2023-08-07 ENCOUNTER — APPOINTMENT (OUTPATIENT)
Dept: RADIATION ONCOLOGY | Facility: CLINIC | Age: 63
End: 2023-08-07
Attending: RADIOLOGY
Payer: COMMERCIAL

## 2023-08-07 PROCEDURE — 77014 PR CT GUIDE FOR PLACEMENT RADIATION THERAPY FIELDS: CPT | Mod: 26 | Performed by: RADIOLOGY

## 2023-08-07 PROCEDURE — 77386 HC IMRT TREATMENT DELIVERY, COMPLEX: CPT | Performed by: RADIOLOGY

## 2023-08-07 PROCEDURE — 77385 HC IMRT TREATMENT DELIVERY, SIMPLE: CPT | Performed by: RADIOLOGY

## 2023-08-08 ENCOUNTER — APPOINTMENT (OUTPATIENT)
Dept: RADIATION ONCOLOGY | Facility: CLINIC | Age: 63
End: 2023-08-08
Attending: RADIOLOGY
Payer: COMMERCIAL

## 2023-08-08 PROCEDURE — 77385 HC IMRT TREATMENT DELIVERY, SIMPLE: CPT | Performed by: RADIOLOGY

## 2023-08-08 PROCEDURE — 77386 HC IMRT TREATMENT DELIVERY, COMPLEX: CPT | Performed by: RADIOLOGY

## 2023-08-08 PROCEDURE — 77014 PR CT GUIDE FOR PLACEMENT RADIATION THERAPY FIELDS: CPT | Mod: 26 | Performed by: RADIOLOGY

## 2023-08-09 ENCOUNTER — APPOINTMENT (OUTPATIENT)
Dept: RADIATION ONCOLOGY | Facility: CLINIC | Age: 63
End: 2023-08-09
Attending: RADIOLOGY
Payer: COMMERCIAL

## 2023-08-09 PROCEDURE — 77385 HC IMRT TREATMENT DELIVERY, SIMPLE: CPT | Performed by: RADIOLOGY

## 2023-08-09 PROCEDURE — 77014 PR CT GUIDE FOR PLACEMENT RADIATION THERAPY FIELDS: CPT | Mod: 26 | Performed by: RADIOLOGY

## 2023-08-09 PROCEDURE — 77386 HC IMRT TREATMENT DELIVERY, COMPLEX: CPT | Performed by: RADIOLOGY

## 2023-08-10 ENCOUNTER — OFFICE VISIT (OUTPATIENT)
Dept: RADIATION ONCOLOGY | Facility: CLINIC | Age: 63
End: 2023-08-10
Attending: UROLOGY
Payer: COMMERCIAL

## 2023-08-10 ENCOUNTER — APPOINTMENT (OUTPATIENT)
Dept: RADIATION ONCOLOGY | Facility: CLINIC | Age: 63
End: 2023-08-10
Attending: RADIOLOGY
Payer: COMMERCIAL

## 2023-08-10 VITALS
BODY MASS INDEX: 26.47 KG/M2 | WEIGHT: 206.2 LBS | SYSTOLIC BLOOD PRESSURE: 152 MMHG | HEART RATE: 67 BPM | DIASTOLIC BLOOD PRESSURE: 93 MMHG

## 2023-08-10 DIAGNOSIS — C61 PROSTATE CANCER (H): Primary | ICD-10-CM

## 2023-08-10 PROCEDURE — 77386 HC IMRT TREATMENT DELIVERY, COMPLEX: CPT | Performed by: RADIOLOGY

## 2023-08-10 PROCEDURE — 77385 HC IMRT TREATMENT DELIVERY, SIMPLE: CPT | Performed by: RADIOLOGY

## 2023-08-10 NOTE — LETTER
8/10/2023         RE: Dakota Myers  [address] [facility] [address]        Dear Colleague,    Thank you for referring your patient, Dakota Myers, to the [facility] RADIATION ONCOLOGY. Please see a copy of my visit note below.    WEEKLY MANAGEMENT NOTE  Radiation Oncology          Patient Name: Dakota Myers  MRN: [id number]       Pelvis Current Dose: 1800/7000 cGy Fractions: 9/35          DAILY DOSE:     200  cGy/ day,  5 times/week      DISEASE UNDER TREATMENT: Adenocarcinoma of the prostate, pT3a pN0 M0 R1, [place] 4+3=7, s/p radical prostatectomy and bilateral pelvic lymphadenectomy(5/9/2023), PSA 0.42(7/13/2023)     ADT: Lupron, 22.5 mg, 6M     SUBJECTIVE: 72 yo man undergoing salvage radiotherapy for prostate cancer.    CTC V5.0 Toxicity Criteria  Fatigue: Grade 0: No toxicity  Pain Score:  0/10    :   Urinary Frequency/Urgency: Grade 0: No change from baseline  Urinary Incontinence: Grade 0: No change from baseline  Dysuria:Grade 0: No change from baseline  Nocturia: 1-2 (baseline 2)  Comment:    GI:  Diarrhea:Grade 0  No change over baseline  Proctitis: Grade 0 No symptom  Comment:      OBJECTIVE:Wt 93.5 kg (206 lb 3.2 oz)   BMI 26.47 kg/m     Wt Readings from Last 2 Encounters:   08/10/23 93.5 kg (206 lb 3.2 oz)   08/03/23 94.6 kg (208 lb 9.6 oz)         IMPRESSION: The patient is tolerating the treatment.  The patient set up, dose, and cone beam CT images were reviewed.      PLAN: Continue radiotherapy. May need to re-sim for bladder volume    PAIN MANAGEMENT PLAN: The patient does not require pain management    MINAL Lockhart M.D.  Department of Radiation Oncology  [facility]

## 2023-08-10 NOTE — PROGRESS NOTES
WEEKLY MANAGEMENT NOTE  Radiation Oncology          Patient Name: Dakota Myers  MRN: [id number]       Pelvis Current Dose: 1800/7000 cGy Fractions: 9/35          DAILY DOSE:     200  cGy/ day,  5 times/week      DISEASE UNDER TREATMENT: Adenocarcinoma of the prostate, pT3a pN0 M0 R1, [place] 4+3=7, s/p radical prostatectomy and bilateral pelvic lymphadenectomy(5/9/2023), PSA 0.42(7/13/2023)     ADT: Lupron, 22.5 mg, 6M     SUBJECTIVE: 74 yo man undergoing salvage radiotherapy for prostate cancer.    CTC V5.0 Toxicity Criteria  Fatigue: Grade 0: No toxicity  Pain Score:  0/10    :   Urinary Frequency/Urgency: Grade 0: No change from baseline  Urinary Incontinence: Grade 0: No change from baseline  Dysuria:Grade 0: No change from baseline  Nocturia: 1-2 (baseline 2)  Comment:    GI:  Diarrhea:Grade 0  No change over baseline  Proctitis: Grade 0 No symptom  Comment:      OBJECTIVE:Wt 93.5 kg (206 lb 3.2 oz)   BMI 26.47 kg/m     Wt Readings from Last 2 Encounters:   08/10/23 93.5 kg (206 lb 3.2 oz)   08/03/23 94.6 kg (208 lb 9.6 oz)         IMPRESSION: The patient is tolerating the treatment.  The patient set up, dose, and cone beam CT images were reviewed.      PLAN: Continue radiotherapy. May need to re-sim for bladder volume    PAIN MANAGEMENT PLAN: The patient does not require pain management    MINAL Lockhart M.D.  Department of Radiation Oncology  [facility]

## 2023-08-11 ENCOUNTER — APPOINTMENT (OUTPATIENT)
Dept: RADIATION ONCOLOGY | Facility: CLINIC | Age: 63
End: 2023-08-11
Attending: RADIOLOGY
Payer: COMMERCIAL

## 2023-08-11 PROCEDURE — 77385 HC IMRT TREATMENT DELIVERY, SIMPLE: CPT | Performed by: RADIOLOGY

## 2023-08-11 PROCEDURE — 77386 HC IMRT TREATMENT DELIVERY, COMPLEX: CPT | Performed by: RADIOLOGY

## 2023-08-11 PROCEDURE — 77336 RADIATION PHYSICS CONSULT: CPT | Performed by: RADIOLOGY

## 2023-08-11 PROCEDURE — 77014 PR CT GUIDE FOR PLACEMENT RADIATION THERAPY FIELDS: CPT | Mod: 26 | Performed by: RADIOLOGY

## 2023-08-11 PROCEDURE — 77427 RADIATION TX MANAGEMENT X5: CPT | Performed by: RADIOLOGY

## 2023-08-14 ENCOUNTER — APPOINTMENT (OUTPATIENT)
Dept: RADIATION ONCOLOGY | Facility: CLINIC | Age: 63
End: 2023-08-14
Attending: RADIOLOGY
Payer: COMMERCIAL

## 2023-08-14 PROCEDURE — 77385 HC IMRT TREATMENT DELIVERY, SIMPLE: CPT | Performed by: RADIOLOGY

## 2023-08-14 PROCEDURE — 77386 HC IMRT TREATMENT DELIVERY, COMPLEX: CPT | Performed by: RADIOLOGY

## 2023-08-14 PROCEDURE — 77014 PR CT GUIDE FOR PLACEMENT RADIATION THERAPY FIELDS: CPT | Mod: 26 | Performed by: RADIOLOGY

## 2023-08-15 ENCOUNTER — APPOINTMENT (OUTPATIENT)
Dept: RADIATION ONCOLOGY | Facility: CLINIC | Age: 63
End: 2023-08-15
Attending: RADIOLOGY
Payer: COMMERCIAL

## 2023-08-15 PROCEDURE — 77014 PR CT GUIDE FOR PLACEMENT RADIATION THERAPY FIELDS: CPT | Mod: 26 | Performed by: RADIOLOGY

## 2023-08-15 PROCEDURE — 77386 HC IMRT TREATMENT DELIVERY, COMPLEX: CPT | Performed by: RADIOLOGY

## 2023-08-15 PROCEDURE — 77385 HC IMRT TREATMENT DELIVERY, SIMPLE: CPT | Performed by: RADIOLOGY

## 2023-08-16 ENCOUNTER — APPOINTMENT (OUTPATIENT)
Dept: RADIATION ONCOLOGY | Facility: CLINIC | Age: 63
End: 2023-08-16
Attending: RADIOLOGY
Payer: COMMERCIAL

## 2023-08-16 PROCEDURE — 77386 HC IMRT TREATMENT DELIVERY, COMPLEX: CPT | Performed by: RADIOLOGY

## 2023-08-16 PROCEDURE — 77014 PR CT GUIDE FOR PLACEMENT RADIATION THERAPY FIELDS: CPT | Mod: 26 | Performed by: RADIOLOGY

## 2023-08-16 PROCEDURE — 77385 HC IMRT TREATMENT DELIVERY, SIMPLE: CPT | Performed by: RADIOLOGY

## 2023-08-17 ENCOUNTER — OFFICE VISIT (OUTPATIENT)
Dept: RADIATION ONCOLOGY | Facility: CLINIC | Age: 63
End: 2023-08-17
Attending: UROLOGY
Payer: COMMERCIAL

## 2023-08-17 ENCOUNTER — APPOINTMENT (OUTPATIENT)
Dept: RADIATION ONCOLOGY | Facility: CLINIC | Age: 63
End: 2023-08-17
Attending: RADIOLOGY
Payer: COMMERCIAL

## 2023-08-17 DIAGNOSIS — C61 PROSTATE CANCER (H): Primary | ICD-10-CM

## 2023-08-17 PROCEDURE — 77386 HC IMRT TREATMENT DELIVERY, COMPLEX: CPT | Performed by: RADIOLOGY

## 2023-08-17 PROCEDURE — 77336 RADIATION PHYSICS CONSULT: CPT | Performed by: RADIOLOGY

## 2023-08-17 PROCEDURE — 77385 HC IMRT TREATMENT DELIVERY, SIMPLE: CPT | Performed by: RADIOLOGY

## 2023-08-17 PROCEDURE — 77427 RADIATION TX MANAGEMENT X5: CPT | Performed by: RADIOLOGY

## 2023-08-17 NOTE — LETTER
8/17/2023         RE: Dakota Myers  [address] [facility] [address]        Dear Colleague,    Thank you for referring your patient, Dakota Myers, to the [facility] RADIATION ONCOLOGY. Please see a copy of my visit note below.    WEEKLY MANAGEMENT NOTE  Radiation Oncology          Patient Name: Dakota Myers  MRN: [id number]       Pelvis Current Dose: 2800/7000 cGy Fractions: 14/35          DAILY DOSE:     200  cGy/ day,  5 times/week      DISEASE UNDER TREATMENT: Adenocarcinoma of the prostate, pT3a pN0 M0, R1, [place] 4+3=7, s/p radical prostatectomy and bilateral pelvic lymphadenectomy(5/9/2023), PSA 0.42(7/13/2023)     ADT: Lupron, 22.5 mg, 6M     SUBJECTIVE: 74 yo man undergoing salvage radiotherapy for prostate cancer. The patient is experiencing hot flashes.    CTC V5.0 Toxicity Criteria  Fatigue: Grade 1: Fatigue relieved by rest  Pain Score:  0/10    :   Urinary Frequency/Urgency: Grade 1: Increase in frequency or nocturia up to 2X normal  Urinary Incontinence: Grade 0: No change from baseline  Dysuria:Grade 0: No change from baseline  Nocturia: 1-2 (baseline 2)  Comment:    GI:  Diarrhea:Grade 1  Increase of <4 stools/day over baseline  Proctitis: Grade 0 No symptom  Comment:Has daily diarrhea. He has taken Immodium pill qD without complete resolution      OBJECTIVE: No acute distress noted. No respiratory distress.      IMPRESSION: The patient is tolerating the treatment.  The patient set up, dose, and cone beam CT images were reviewed.      PLAN: Continue radiotherapy. May need to re-sim for bladder volume.    PAIN MANAGEMENT PLAN: The patient does not require pain management    MINAL Lockhart M.D.  Department of Radiation Oncology  [facility]

## 2023-08-17 NOTE — PROGRESS NOTES
WEEKLY MANAGEMENT NOTE  Radiation Oncology          Patient Name: Dakota Myers  MRN: [id number]       Pelvis Current Dose: 2800/7000 cGy Fractions: 14/35          DAILY DOSE:     200  cGy/ day,  5 times/week      DISEASE UNDER TREATMENT: Adenocarcinoma of the prostate, pT3a pN0 M0, R1, [place] 4+3=7, s/p radical prostatectomy and bilateral pelvic lymphadenectomy(5/9/2023), PSA 0.42(7/13/2023)     ADT: Lupron, 22.5 mg, 6M     SUBJECTIVE: 74 yo man undergoing salvage radiotherapy for prostate cancer. The patient is experiencing hot flashes.    CTC V5.0 Toxicity Criteria  Fatigue: Grade 1: Fatigue relieved by rest  Pain Score:  0/10    :   Urinary Frequency/Urgency: Grade 1: Increase in frequency or nocturia up to 2X normal  Urinary Incontinence: Grade 0: No change from baseline  Dysuria:Grade 0: No change from baseline  Nocturia: 1-2 (baseline 2)  Comment:    GI:  Diarrhea:Grade 1  Increase of <4 stools/day over baseline  Proctitis: Grade 0 No symptom  Comment:Has daily diarrhea. He has taken Immodium pill qD without complete resolution      OBJECTIVE: No acute distress noted. No respiratory distress.      IMPRESSION: The patient is tolerating the treatment.  The patient set up, dose, and cone beam CT images were reviewed.      PLAN: Continue radiotherapy. May need to re-sim for bladder volume.    PAIN MANAGEMENT PLAN: The patient does not require pain management    MINAL Lockhart M.D.  Department of Radiation Oncology  [facility]

## 2023-08-18 ENCOUNTER — OFFICE VISIT (OUTPATIENT)
Dept: RADIATION ONCOLOGY | Facility: CLINIC | Age: 63
End: 2023-08-18
Attending: RADIOLOGY
Payer: COMMERCIAL

## 2023-08-18 DIAGNOSIS — C61 PROSTATE CANCER (H): Primary | ICD-10-CM

## 2023-08-18 NOTE — LETTER
8/18/2023         RE: Dakota Myers  [address] [facility] [address]        Dear Colleague,    Thank you for referring your patient, Dakota Myers, to the [facility] RADIATION ONCOLOGY. Please see a copy of my visit note below.    Simulation Note    Date: 8/18/2023     Patient: Dakota Myers    Diagnosis: Data Unavailable    Type of Simulation:  Cure/ Definitive     Patient Position: Supine    Patient Immobilization: Custom:  Vac-Loc    Simulation Aid(s): None    Image Acquisition: Conventional CT simulation without 4D    Total Dose Planned: 7000 cGy      Dose/fraction:200 cGy    Energy of machine:  10 MV           Type of Radiotherapy Technique: IMRT(Intensity Modulated Radiotherapy)    This was a duplicate simulation to replan DVH due to bladder volume variations during treatment.      Continuing Physcis/Dosimetry  and Dose calculations are ordered.  Simple simulations will be done prior to new start and changes in fields.  Weekly on treat visit.      MINAL Lockhart M.D.  Department of Radiation Oncology  [facility]       Again, thank you for allowing me to participate in the care of your patient.        Sincerely,        Joseph Lockhart MD

## 2023-08-23 ENCOUNTER — APPOINTMENT (OUTPATIENT)
Dept: RADIATION ONCOLOGY | Facility: CLINIC | Age: 63
End: 2023-08-23
Attending: RADIOLOGY
Payer: COMMERCIAL

## 2023-08-23 PROCEDURE — 77385 HC IMRT TREATMENT DELIVERY, SIMPLE: CPT | Performed by: RADIOLOGY

## 2023-08-23 PROCEDURE — 77014 PR CT GUIDE FOR PLACEMENT RADIATION THERAPY FIELDS: CPT | Mod: 26 | Performed by: RADIOLOGY

## 2023-08-23 PROCEDURE — 77300 RADIATION THERAPY DOSE PLAN: CPT | Performed by: RADIOLOGY

## 2023-08-23 PROCEDURE — 77300 RADIATION THERAPY DOSE PLAN: CPT | Mod: 26 | Performed by: RADIOLOGY

## 2023-08-23 NOTE — PROGRESS NOTES
Simulation Note    Date: 8/18/2023     Patient: Dakota Myers    Diagnosis: Data Unavailable    Type of Simulation:  Cure/ Definitive     Patient Position: Supine    Patient Immobilization: Custom:  Vac-Loc    Simulation Aid(s): None    Image Acquisition: Conventional CT simulation without 4D    Total Dose Planned: 7000 cGy      Dose/fraction:200 cGy    Energy of machine:  10 MV           Type of Radiotherapy Technique: IMRT(Intensity Modulated Radiotherapy)    This was a duplicate simulation to replan DVH due to bladder volume variations during treatment.      Continuing Physcis/Dosimetry  and Dose calculations are ordered.  Simple simulations will be done prior to new start and changes in fields.  Weekly on treat visit.      MINAL Lockhart M.D.  Department of Radiation Oncology  [facility]

## 2023-08-24 ENCOUNTER — APPOINTMENT (OUTPATIENT)
Dept: RADIATION ONCOLOGY | Facility: CLINIC | Age: 63
End: 2023-08-24
Attending: RADIOLOGY
Payer: COMMERCIAL

## 2023-08-24 ENCOUNTER — OFFICE VISIT (OUTPATIENT)
Dept: RADIATION ONCOLOGY | Facility: CLINIC | Age: 63
End: 2023-08-24
Attending: UROLOGY
Payer: COMMERCIAL

## 2023-08-24 VITALS — SYSTOLIC BLOOD PRESSURE: 141 MMHG | WEIGHT: 206 LBS | DIASTOLIC BLOOD PRESSURE: 86 MMHG | BODY MASS INDEX: 26.45 KG/M2

## 2023-08-24 DIAGNOSIS — C61 PROSTATE CANCER (H): Primary | ICD-10-CM

## 2023-08-24 PROCEDURE — 77385 HC IMRT TREATMENT DELIVERY, SIMPLE: CPT | Performed by: RADIOLOGY

## 2023-08-24 NOTE — LETTER
8/24/2023         RE: Dakota Myers  [address] [facility] [address]        Dear Colleague,    Thank you for referring your patient, Dakota Myers, to the [facility] RADIATION ONCOLOGY. Please see a copy of my visit note below.    WEEKLY MANAGEMENT NOTE  Radiation Oncology          Patient Name: Dakota Myers  MRN: [id number]       Pelvis Current Dose: 3200/7000 cGy Fractions: 16/35          DAILY DOSE:     200  cGy/ day,  5 times/week      DISEASE UNDER TREATMENT: Adenocarcinoma of the prostate, pT3a pN0 M0, R1, [place] 4+3=7, s/p radical prostatectomy and bilateral pelvic lymphadenectomy(5/9/2023), PSA 0.42(7/13/2023)     ADT: Lupron, 22.5 mg, 6M (7/19/2023)    SUBJECTIVE: 72 yo man undergoing salvage radiotherapy for prostate cancer. The patient is experiencing hot flashes.  Also developed diarrhea requiring Immodium. The patient underwent re-simulation to optimize bladder DVH.    CTC V5.0 Toxicity Criteria  Fatigue: Grade 1: Fatigue relieved by rest  Pain Score:  0/10    :   Urinary Frequency/Urgency: Grade 1: Increase in frequency or nocturia up to 2X normal  Urinary Incontinence: Grade 0: No change from baseline  Dysuria:Grade 0: No change from baseline  Nocturia: 1-2 (baseline 2)  Comment:    GI:  Diarrhea:Grade 1  Increase of <4 stools/day over baseline  Proctitis: Grade 0 No symptom  Comment:Has daily diarrhea. He has taken Immodium pill qD without complete resolution      OBJECTIVE: No acute distress noted. No respiratory distress.      IMPRESSION: The patient is tolerating the treatment.  The patient set up, dose, and cone beam CT images were reviewed.      PLAN: Continue radiotherapy.     PAIN MANAGEMENT PLAN: The patient does not require pain management    MINAL Lockhart M.D.  Department of Radiation Oncology  [facility]

## 2023-08-24 NOTE — PROGRESS NOTES
WEEKLY MANAGEMENT NOTE  Radiation Oncology          Patient Name: Dakota Myers  MRN: [id number]       Pelvis Current Dose: 3200/7000 cGy Fractions: 16/35          DAILY DOSE:     200  cGy/ day,  5 times/week      DISEASE UNDER TREATMENT: Adenocarcinoma of the prostate, pT3a pN0 M0, R1, [place] 4+3=7, s/p radical prostatectomy and bilateral pelvic lymphadenectomy(5/9/2023), PSA 0.42(7/13/2023)     ADT: Lupron, 22.5 mg, 6M (7/19/2023)    SUBJECTIVE: 74 yo man undergoing salvage radiotherapy for prostate cancer. The patient is experiencing hot flashes.  Also developed diarrhea requiring Immodium. The patient underwent re-simulation to optimize bladder DVH.    CTC V5.0 Toxicity Criteria  Fatigue: Grade 1: Fatigue relieved by rest  Pain Score:  0/10    :   Urinary Frequency/Urgency: Grade 1: Increase in frequency or nocturia up to 2X normal  Urinary Incontinence: Grade 0: No change from baseline  Dysuria:Grade 0: No change from baseline  Nocturia: 1-2 (baseline 2)  Comment:    GI:  Diarrhea:Grade 1  Increase of <4 stools/day over baseline  Proctitis: Grade 0 No symptom  Comment:Has daily diarrhea. He has taken Immodium pill qD without complete resolution      OBJECTIVE: No acute distress noted. No respiratory distress.      IMPRESSION: The patient is tolerating the treatment.  The patient set up, dose, and cone beam CT images were reviewed.      PLAN: Continue radiotherapy.     PAIN MANAGEMENT PLAN: The patient does not require pain management    MINAL Lockhart M.D.  Department of Radiation Oncology  [facility]

## 2023-08-25 ENCOUNTER — APPOINTMENT (OUTPATIENT)
Dept: RADIATION ONCOLOGY | Facility: CLINIC | Age: 63
End: 2023-08-25
Attending: RADIOLOGY
Payer: COMMERCIAL

## 2023-08-25 PROCEDURE — 77385 HC IMRT TREATMENT DELIVERY, SIMPLE: CPT | Performed by: RADIOLOGY

## 2023-08-25 PROCEDURE — 77014 PR CT GUIDE FOR PLACEMENT RADIATION THERAPY FIELDS: CPT | Mod: 26 | Performed by: RADIOLOGY

## 2023-08-28 ENCOUNTER — APPOINTMENT (OUTPATIENT)
Dept: RADIATION ONCOLOGY | Facility: CLINIC | Age: 63
End: 2023-08-28
Attending: RADIOLOGY
Payer: COMMERCIAL

## 2023-08-28 PROCEDURE — 77014 PR CT GUIDE FOR PLACEMENT RADIATION THERAPY FIELDS: CPT | Mod: 26 | Performed by: RADIOLOGY

## 2023-08-28 PROCEDURE — 77385 HC IMRT TREATMENT DELIVERY, SIMPLE: CPT | Performed by: RADIOLOGY

## 2023-08-29 ENCOUNTER — APPOINTMENT (OUTPATIENT)
Dept: RADIATION ONCOLOGY | Facility: CLINIC | Age: 63
End: 2023-08-29
Attending: RADIOLOGY
Payer: COMMERCIAL

## 2023-08-29 PROCEDURE — 77427 RADIATION TX MANAGEMENT X5: CPT | Performed by: RADIOLOGY

## 2023-08-29 PROCEDURE — 77014 PR CT GUIDE FOR PLACEMENT RADIATION THERAPY FIELDS: CPT | Mod: 26 | Performed by: RADIOLOGY

## 2023-08-29 PROCEDURE — 77336 RADIATION PHYSICS CONSULT: CPT | Performed by: RADIOLOGY

## 2023-08-29 PROCEDURE — 77385 HC IMRT TREATMENT DELIVERY, SIMPLE: CPT | Performed by: RADIOLOGY

## 2023-08-30 ENCOUNTER — APPOINTMENT (OUTPATIENT)
Dept: RADIATION ONCOLOGY | Facility: CLINIC | Age: 63
End: 2023-08-30
Attending: RADIOLOGY
Payer: COMMERCIAL

## 2023-08-30 PROCEDURE — 77385 HC IMRT TREATMENT DELIVERY, SIMPLE: CPT | Performed by: RADIOLOGY

## 2023-08-30 PROCEDURE — 77014 PR CT GUIDE FOR PLACEMENT RADIATION THERAPY FIELDS: CPT | Mod: 26 | Performed by: RADIOLOGY

## 2023-08-31 ENCOUNTER — APPOINTMENT (OUTPATIENT)
Dept: RADIATION ONCOLOGY | Facility: CLINIC | Age: 63
End: 2023-08-31
Attending: RADIOLOGY
Payer: COMMERCIAL

## 2023-08-31 ENCOUNTER — OFFICE VISIT (OUTPATIENT)
Dept: RADIATION ONCOLOGY | Facility: CLINIC | Age: 63
End: 2023-08-31
Attending: UROLOGY
Payer: COMMERCIAL

## 2023-08-31 VITALS
SYSTOLIC BLOOD PRESSURE: 148 MMHG | BODY MASS INDEX: 26.32 KG/M2 | DIASTOLIC BLOOD PRESSURE: 101 MMHG | HEART RATE: 76 BPM | WEIGHT: 205 LBS

## 2023-08-31 DIAGNOSIS — C61 PROSTATE CANCER (H): Primary | ICD-10-CM

## 2023-08-31 PROCEDURE — 77385 HC IMRT TREATMENT DELIVERY, SIMPLE: CPT | Performed by: RADIOLOGY

## 2023-08-31 NOTE — PROGRESS NOTES
WEEKLY MANAGEMENT NOTE  Radiation Oncology          Patient Name: Dakota Myers  MRN: [id number]       Pelvis Current Dose: 4200/7000 cGy Fractions: 21/35          DAILY DOSE:     200  cGy/ day,  5 times/week      DISEASE UNDER TREATMENT: Adenocarcinoma of the prostate, pT3a pN0 M0, R1, [place] 4+3=7, s/p radical prostatectomy and bilateral pelvic lymphadenectomy(5/9/2023), PSA 0.42(7/13/2023)     ADT: Lupron, 22.5 mg, 6M (7/19/2023)    SUBJECTIVE: 72 yo man undergoing salvage radiotherapy for prostate cancer. The patient is experiencing hot flashes.  Also developed diarrhea requiring Immodium. The patient underwent re-simulation to optimize bladder DVH.    CTC V5.0 Toxicity Criteria  Fatigue: Grade 1: Fatigue relieved by rest  Pain Score:  0/10    :   Urinary Frequency/Urgency: Grade 1: Increase in frequency or nocturia up to 2X normal  Urinary Incontinence: Grade 0: No change from baseline  Dysuria:Grade 0: No change from baseline  Nocturia: 1-2 (baseline 2)  Comment:    GI:  Diarrhea:Grade 1  Increase of <4 stools/day over baseline  Proctitis: Grade 0 No symptom  Comment: Has intermittent diarrhea. This is sometimes aggravated by stress. He has taken Immodium pill qoD with some relief      OBJECTIVE: BP (!) 148/101   Pulse 76   Wt 93 kg (205 lb)   BMI 26.32 kg/m     Wt Readings from Last 2 Encounters:   08/31/23 93 kg (205 lb)   08/24/23 93.4 kg (206 lb)          IMPRESSION: The patient is tolerating the treatment.  The patient set up, dose, and cone beam CT images were reviewed.      PLAN: Complete radiotherapy with follow up  in 2 M after EOT with a PSA.     PAIN MANAGEMENT PLAN: The patient does not require pain management    MINAL Lockhart M.D.  Department of Radiation Oncology  [facility]

## 2023-08-31 NOTE — LETTER
8/31/2023         RE: Dakota Myers  [address] [facility] [address]        Dear Colleague,    Thank you for referring your patient, Dakota Myers, to the [facility] RADIATION ONCOLOGY. Please see a copy of my visit note below.    WEEKLY MANAGEMENT NOTE  Radiation Oncology          Patient Name: Dakota Myers  MRN: [id number]       Pelvis Current Dose: 4200/7000 cGy Fractions: 21/35          DAILY DOSE:     200  cGy/ day,  5 times/week      DISEASE UNDER TREATMENT: Adenocarcinoma of the prostate, pT3a pN0 M0, R1, [place] 4+3=7, s/p radical prostatectomy and bilateral pelvic lymphadenectomy(5/9/2023), PSA 0.42(7/13/2023)     ADT: Lupron, 22.5 mg, 6M (7/19/2023)    SUBJECTIVE: 74 yo man undergoing salvage radiotherapy for prostate cancer. The patient is experiencing hot flashes.  Also developed diarrhea requiring Immodium. The patient underwent re-simulation to optimize bladder DVH.    CTC V5.0 Toxicity Criteria  Fatigue: Grade 1: Fatigue relieved by rest  Pain Score:  0/10    :   Urinary Frequency/Urgency: Grade 1: Increase in frequency or nocturia up to 2X normal  Urinary Incontinence: Grade 0: No change from baseline  Dysuria:Grade 0: No change from baseline  Nocturia: 1-2 (baseline 2)  Comment:    GI:  Diarrhea:Grade 1  Increase of <4 stools/day over baseline  Proctitis: Grade 0 No symptom  Comment: Has intermittent diarrhea. This is sometimes aggravated by stress. He has taken Immodium pill qoD with some relief      OBJECTIVE: BP (!) 148/101   Pulse 76   Wt 93 kg (205 lb)   BMI 26.32 kg/m     Wt Readings from Last 2 Encounters:   08/31/23 93 kg (205 lb)   08/24/23 93.4 kg (206 lb)          IMPRESSION: The patient is tolerating the treatment.  The patient set up, dose, and cone beam CT images were reviewed.      PLAN: Complete radiotherapy with follow up  in 2 M after EOT with a PSA.     PAIN MANAGEMENT PLAN: The patient does not require pain management    MINAL Lockhart,  M.D.  Department of Radiation Oncology  [facility]

## 2023-09-01 ENCOUNTER — APPOINTMENT (OUTPATIENT)
Dept: RADIATION ONCOLOGY | Facility: CLINIC | Age: 63
End: 2023-09-01
Attending: RADIOLOGY
Payer: COMMERCIAL

## 2023-09-01 PROCEDURE — 77385 HC IMRT TREATMENT DELIVERY, SIMPLE: CPT | Performed by: RADIOLOGY

## 2023-09-01 PROCEDURE — 77014 PR CT GUIDE FOR PLACEMENT RADIATION THERAPY FIELDS: CPT | Mod: 26 | Performed by: STUDENT IN AN ORGANIZED HEALTH CARE EDUCATION/TRAINING PROGRAM

## 2023-09-05 ENCOUNTER — APPOINTMENT (OUTPATIENT)
Dept: RADIATION ONCOLOGY | Facility: CLINIC | Age: 63
End: 2023-09-05
Attending: RADIOLOGY
Payer: COMMERCIAL

## 2023-09-05 PROCEDURE — 77385 HC IMRT TREATMENT DELIVERY, SIMPLE: CPT | Performed by: RADIOLOGY

## 2023-09-05 PROCEDURE — 77014 PR CT GUIDE FOR PLACEMENT RADIATION THERAPY FIELDS: CPT | Mod: 26 | Performed by: SURGERY

## 2023-09-06 ENCOUNTER — APPOINTMENT (OUTPATIENT)
Dept: RADIATION ONCOLOGY | Facility: CLINIC | Age: 63
End: 2023-09-06
Attending: RADIOLOGY
Payer: COMMERCIAL

## 2023-09-06 VITALS
SYSTOLIC BLOOD PRESSURE: 158 MMHG | DIASTOLIC BLOOD PRESSURE: 96 MMHG | HEART RATE: 66 BPM | WEIGHT: 205.6 LBS | OXYGEN SATURATION: 99 % | BODY MASS INDEX: 26.4 KG/M2

## 2023-09-06 DIAGNOSIS — C61 PROSTATE CANCER (H): Primary | ICD-10-CM

## 2023-09-06 PROCEDURE — 77336 RADIATION PHYSICS CONSULT: CPT | Performed by: RADIOLOGY

## 2023-09-06 PROCEDURE — 77385 HC IMRT TREATMENT DELIVERY, SIMPLE: CPT | Performed by: RADIOLOGY

## 2023-09-06 PROCEDURE — 77427 RADIATION TX MANAGEMENT X5: CPT | Performed by: RADIOLOGY

## 2023-09-06 NOTE — LETTER
9/6/2023         RE: Dakota Myers  [address] [facility] [address]        Dear Colleague,    Thank you for referring your patient, Dakota Myers, to the [facility] RADIATION ONCOLOGY. Please see a copy of my visit note below.    WEEKLY MANAGEMENT NOTE  Radiation Oncology          Patient Name: Dakota Myers  MRN: [id number]       Pelvis Current Dose: 4200/7000  cGy Fractions: 24/35          DAILY DOSE:     200  cGy/ day,  5 times/week      DISEASE UNDER TREATMENT: Adenocarcinoma of the prostate, pT3a pN0 M0, R1, [place] 4+3=7, s/p radical prostatectomy and bilateral pelvic lymphadenectomy(5/9/2023), PSA 0.42(7/13/2023)     ADT: Lupron, 22.5 mg, 6M (7/19/2023)    SUBJECTIVE: 72 yo man undergoing salvage radiotherapy for prostate cancer. The patient is experiencing hot flashes.  Also developed diarrhea; this is stable but requires ongoing Imodium use. He is urinating 2-3 times per night, which is also stable.     CTC V5.0 Toxicity Criteria  Fatigue: Grade 1: Fatigue relieved by rest  Pain Score:  0/10    :   Urinary Frequency/Urgency: Grade 1: Increase in frequency or nocturia up to 2X normal  Urinary Incontinence: Grade 0: No change from baseline  Dysuria:Grade 0: No change from baseline  Nocturia: 1-2 (baseline 2)    GI:  Diarrhea:Grade 1  Increase of <4 stools/day over baseline  Proctitis: Grade 0 No symptom  Comment: Has intermittent diarrhea. This is sometimes aggravated by stress. He has taken Immodium pill qoD with some relief      OBJECTIVE: BP (!) 158/96   Pulse 66   Wt 93.3 kg (205 lb 9.6 oz)   SpO2 99%   BMI 26.40 kg/m     Wt Readings from Last 2 Encounters:   09/06/23 93.3 kg (205 lb 9.6 oz)   08/31/23 93 kg (205 lb)    Comment: Mr. Myers reports elevated BP in the hospital setting, lower at home and with PCP      IMPRESSION: The patient is tolerating the treatment.  The patient set up, dose, and cone beam CT images were reviewed.      PLAN: Complete radiotherapy with  follow up  in 2 M after EOT with a PSA.     PAIN MANAGEMENT PLAN: The patient does not require pain management    Jagruti Erickson MD MPH PhD    Department of Radiation Oncology    Covering for primary Radiation Oncologist Dr. Joseph Lockhart      Again, thank you for allowing me to participate in the care of your patient.        Sincerely,        Jagruti Erickson

## 2023-09-06 NOTE — PROGRESS NOTES
WEEKLY MANAGEMENT NOTE  Radiation Oncology          Patient Name: Dakota Myers  MRN: [id number]       Pelvis Current Dose: 4200/7000  cGy Fractions: 24/35          DAILY DOSE:     200  cGy/ day,  5 times/week      DISEASE UNDER TREATMENT: Adenocarcinoma of the prostate, pT3a pN0 M0, R1, [place] 4+3=7, s/p radical prostatectomy and bilateral pelvic lymphadenectomy(5/9/2023), PSA 0.42(7/13/2023)     ADT: Lupron, 22.5 mg, 6M (7/19/2023)    SUBJECTIVE: 74 yo man undergoing salvage radiotherapy for prostate cancer. The patient is experiencing hot flashes.  Also developed diarrhea; this is stable but requires ongoing Imodium use. He is urinating 2-3 times per night, which is also stable.     CTC V5.0 Toxicity Criteria  Fatigue: Grade 1: Fatigue relieved by rest  Pain Score:  0/10    :   Urinary Frequency/Urgency: Grade 1: Increase in frequency or nocturia up to 2X normal  Urinary Incontinence: Grade 0: No change from baseline  Dysuria:Grade 0: No change from baseline  Nocturia: 1-2 (baseline 2)    GI:  Diarrhea:Grade 1  Increase of <4 stools/day over baseline  Proctitis: Grade 0 No symptom  Comment: Has intermittent diarrhea. This is sometimes aggravated by stress. He has taken Immodium pill qoD with some relief      OBJECTIVE: BP (!) 158/96   Pulse 66   Wt 93.3 kg (205 lb 9.6 oz)   SpO2 99%   BMI 26.40 kg/m     Wt Readings from Last 2 Encounters:   09/06/23 93.3 kg (205 lb 9.6 oz)   08/31/23 93 kg (205 lb)    Comment: Mr. Myers reports elevated BP in the hospital setting, lower at home and with PCP      IMPRESSION: The patient is tolerating the treatment.  The patient set up, dose, and cone beam CT images were reviewed.      PLAN: Complete radiotherapy with follow up  in 2 M after EOT with a PSA.     PAIN MANAGEMENT PLAN: The patient does not require pain management    Jagruti Erickson MD MPH PhD    Department of Radiation Oncology    Covering for primary Radiation Oncologist Dr. Joseph Lockhart

## 2023-09-07 ENCOUNTER — APPOINTMENT (OUTPATIENT)
Dept: RADIATION ONCOLOGY | Facility: CLINIC | Age: 63
End: 2023-09-07
Attending: RADIOLOGY
Payer: COMMERCIAL

## 2023-09-07 PROCEDURE — 77385 HC IMRT TREATMENT DELIVERY, SIMPLE: CPT | Performed by: RADIOLOGY

## 2023-09-07 PROCEDURE — 77014 PR CT GUIDE FOR PLACEMENT RADIATION THERAPY FIELDS: CPT | Mod: 26 | Performed by: STUDENT IN AN ORGANIZED HEALTH CARE EDUCATION/TRAINING PROGRAM

## 2023-09-08 ENCOUNTER — APPOINTMENT (OUTPATIENT)
Dept: RADIATION ONCOLOGY | Facility: CLINIC | Age: 63
End: 2023-09-08
Attending: RADIOLOGY
Payer: COMMERCIAL

## 2023-09-08 PROCEDURE — 77385 HC IMRT TREATMENT DELIVERY, SIMPLE: CPT | Performed by: RADIOLOGY

## 2023-09-08 PROCEDURE — 77014 PR CT GUIDE FOR PLACEMENT RADIATION THERAPY FIELDS: CPT | Mod: 26 | Performed by: RADIOLOGY

## 2023-09-11 ENCOUNTER — APPOINTMENT (OUTPATIENT)
Dept: RADIATION ONCOLOGY | Facility: CLINIC | Age: 63
End: 2023-09-11
Attending: RADIOLOGY
Payer: COMMERCIAL

## 2023-09-11 PROCEDURE — 77385 HC IMRT TREATMENT DELIVERY, SIMPLE: CPT | Performed by: RADIOLOGY

## 2023-09-11 PROCEDURE — 77014 PR CT GUIDE FOR PLACEMENT RADIATION THERAPY FIELDS: CPT | Mod: 26 | Performed by: RADIOLOGY

## 2023-09-12 ENCOUNTER — OFFICE VISIT (OUTPATIENT)
Dept: RADIATION ONCOLOGY | Facility: CLINIC | Age: 63
End: 2023-09-12
Attending: RADIOLOGY
Payer: COMMERCIAL

## 2023-09-12 VITALS
SYSTOLIC BLOOD PRESSURE: 141 MMHG | BODY MASS INDEX: 26.19 KG/M2 | HEART RATE: 74 BPM | WEIGHT: 204 LBS | DIASTOLIC BLOOD PRESSURE: 82 MMHG

## 2023-09-12 DIAGNOSIS — C61 PROSTATE CANCER (H): Primary | ICD-10-CM

## 2023-09-12 PROCEDURE — 77385 HC IMRT TREATMENT DELIVERY, SIMPLE: CPT | Performed by: RADIOLOGY

## 2023-09-12 NOTE — LETTER
9/12/2023         RE: Dakota Myers  [address] [facility] [address]        Dear Colleague,    Thank you for referring your patient, Dakota Myers, to the [facility] RADIATION ONCOLOGY. Please see a copy of my visit note below.    WEEKLY MANAGEMENT NOTE  Radiation Oncology          Patient Name: Dakota Myers  MRN: [id number]       Pelvis Current Dose: 5600/7000  cGy Fractions: 28/35          DAILY DOSE:     200  cGy/ day,  5 times/week      DISEASE UNDER TREATMENT: Adenocarcinoma of the prostate, pT3a pN0 M0, R1, [place] 4+3=7, s/p radical prostatectomy and bilateral pelvic lymphadenectomy(5/9/2023), PSA 0.42(7/13/2023)     ADT: Lupron, 22.5 mg, 6M (7/19/2023)    SUBJECTIVE: 74 yo man undergoing salvage radiotherapy for prostate cancer. The patient is experiencing hot flashes.  Also developed diarrhea; this is stable but requires ongoing Imodium use. He is urinating 2-3 times per night, which is also stable.  He is otherwise doing well.    CTC V5.0 Toxicity Criteria  Fatigue: Grade 1: Fatigue relieved by rest  Pain Score:  0/10    :   Urinary Frequency/Urgency: Grade 1: Increase in frequency or nocturia up to 2X normal  Urinary Incontinence: Grade 0: No change from baseline  Dysuria:Grade 0: No change from baseline  Nocturia: 1-2 (baseline 2)    GI:  Diarrhea:Grade 1  Increase of <4 stools/day over baseline  Proctitis: Grade 0 No symptom  Comment: Has intermittent diarrhea. This is sometimes aggravated by stress. He has taken Immodium pill qoD with some relief      OBJECTIVE: BP (!) 141/82   Pulse 74   Wt 92.5 kg (204 lb)   BMI 26.19 kg/m     Wt Readings from Last 2 Encounters:   09/12/23 92.5 kg (204 lb)   09/06/23 93.3 kg (205 lb 9.6 oz)   Comment: Mr. Myers reports elevated BP in the hospital setting, lower at home and with PCP      IMPRESSION: The patient is tolerating the treatment.  The patient set up, dose, and cone beam CT images were reviewed.      PLAN: Complete radiotherapy  with follow up  in 2 M after EOT with a PSA.  This has been scheduled.    PAIN MANAGEMENT PLAN: The patient does not require pain management    Lena Cleaning MD    Department of Radiation Oncology    Covering for primary Radiation Oncologist Dr. Joseph Lockhart      Again, thank you for allowing me to participate in the care of your patient.        Sincerely,        Lena Cleaning MD

## 2023-09-12 NOTE — PROGRESS NOTES
WEEKLY MANAGEMENT NOTE  Radiation Oncology          Patient Name: Dakota Myers  MRN: [id number]       Pelvis Current Dose: 5600/7000  cGy Fractions: 28/35          DAILY DOSE:     200  cGy/ day,  5 times/week      DISEASE UNDER TREATMENT: Adenocarcinoma of the prostate, pT3a pN0 M0, R1, [place] 4+3=7, s/p radical prostatectomy and bilateral pelvic lymphadenectomy(5/9/2023), PSA 0.42(7/13/2023)     ADT: Lupron, 22.5 mg, 6M (7/19/2023)    SUBJECTIVE: 74 yo man undergoing salvage radiotherapy for prostate cancer. The patient is experiencing hot flashes.  Also developed diarrhea; this is stable but requires ongoing Imodium use. He is urinating 2-3 times per night, which is also stable.  He is otherwise doing well.    CTC V5.0 Toxicity Criteria  Fatigue: Grade 1: Fatigue relieved by rest  Pain Score:  0/10    :   Urinary Frequency/Urgency: Grade 1: Increase in frequency or nocturia up to 2X normal  Urinary Incontinence: Grade 0: No change from baseline  Dysuria:Grade 0: No change from baseline  Nocturia: 1-2 (baseline 2)    GI:  Diarrhea:Grade 1  Increase of <4 stools/day over baseline  Proctitis: Grade 0 No symptom  Comment: Has intermittent diarrhea. This is sometimes aggravated by stress. He has taken Immodium pill qoD with some relief      OBJECTIVE: BP (!) 141/82   Pulse 74   Wt 92.5 kg (204 lb)   BMI 26.19 kg/m     Wt Readings from Last 2 Encounters:   09/12/23 92.5 kg (204 lb)   09/06/23 93.3 kg (205 lb 9.6 oz)   Comment: Mr. Myers reports elevated BP in the hospital setting, lower at home and with PCP      IMPRESSION: The patient is tolerating the treatment.  The patient set up, dose, and cone beam CT images were reviewed.      PLAN: Complete radiotherapy with follow up  in 2 M after EOT with a PSA.  This has been scheduled.    PAIN MANAGEMENT PLAN: The patient does not require pain management    Lena Cleaning MD    Department of Radiation Oncology    Covering for primary Radiation  Oncologist Dr. Joseph Lockhart

## 2023-09-13 ENCOUNTER — APPOINTMENT (OUTPATIENT)
Dept: RADIATION ONCOLOGY | Facility: CLINIC | Age: 63
End: 2023-09-13
Attending: RADIOLOGY
Payer: COMMERCIAL

## 2023-09-13 PROCEDURE — 77014 PR CT GUIDE FOR PLACEMENT RADIATION THERAPY FIELDS: CPT | Mod: 26 | Performed by: RADIOLOGY

## 2023-09-13 PROCEDURE — 77427 RADIATION TX MANAGEMENT X5: CPT | Performed by: RADIOLOGY

## 2023-09-13 PROCEDURE — 77336 RADIATION PHYSICS CONSULT: CPT | Performed by: RADIOLOGY

## 2023-09-13 PROCEDURE — 77385 HC IMRT TREATMENT DELIVERY, SIMPLE: CPT | Performed by: RADIOLOGY

## 2023-09-14 ENCOUNTER — APPOINTMENT (OUTPATIENT)
Dept: RADIATION ONCOLOGY | Facility: CLINIC | Age: 63
End: 2023-09-14
Attending: RADIOLOGY
Payer: COMMERCIAL

## 2023-09-14 PROCEDURE — 77385 HC IMRT TREATMENT DELIVERY, SIMPLE: CPT | Performed by: RADIOLOGY

## 2023-09-14 PROCEDURE — 77014 PR CT GUIDE FOR PLACEMENT RADIATION THERAPY FIELDS: CPT | Mod: 26 | Performed by: STUDENT IN AN ORGANIZED HEALTH CARE EDUCATION/TRAINING PROGRAM

## 2023-09-15 ENCOUNTER — APPOINTMENT (OUTPATIENT)
Dept: RADIATION ONCOLOGY | Facility: CLINIC | Age: 63
End: 2023-09-15
Attending: RADIOLOGY
Payer: COMMERCIAL

## 2023-09-15 PROCEDURE — 77014 PR CT GUIDE FOR PLACEMENT RADIATION THERAPY FIELDS: CPT | Mod: 26 | Performed by: RADIOLOGY

## 2023-09-15 PROCEDURE — 77385 HC IMRT TREATMENT DELIVERY, SIMPLE: CPT | Performed by: RADIOLOGY

## 2023-09-18 ENCOUNTER — APPOINTMENT (OUTPATIENT)
Dept: RADIATION ONCOLOGY | Facility: CLINIC | Age: 63
End: 2023-09-18
Attending: RADIOLOGY
Payer: COMMERCIAL

## 2023-09-18 PROCEDURE — 77385 HC IMRT TREATMENT DELIVERY, SIMPLE: CPT | Performed by: RADIOLOGY

## 2023-09-18 PROCEDURE — 77014 PR CT GUIDE FOR PLACEMENT RADIATION THERAPY FIELDS: CPT | Mod: 26 | Performed by: RADIOLOGY

## 2023-09-19 ENCOUNTER — ALLIED HEALTH/NURSE VISIT (OUTPATIENT)
Dept: RADIATION ONCOLOGY | Facility: CLINIC | Age: 63
End: 2023-09-19
Attending: RADIOLOGY
Payer: COMMERCIAL

## 2023-09-19 VITALS
SYSTOLIC BLOOD PRESSURE: 154 MMHG | DIASTOLIC BLOOD PRESSURE: 85 MMHG | BODY MASS INDEX: 26.83 KG/M2 | WEIGHT: 209 LBS | HEART RATE: 75 BPM

## 2023-09-19 DIAGNOSIS — C61 PROSTATE CANCER (H): Primary | ICD-10-CM

## 2023-09-19 PROCEDURE — 77385 HC IMRT TREATMENT DELIVERY, SIMPLE: CPT | Performed by: RADIOLOGY

## 2023-09-19 PROCEDURE — 77014 PR CT GUIDE FOR PLACEMENT RADIATION THERAPY FIELDS: CPT | Mod: 26 | Performed by: RADIOLOGY

## 2023-09-19 NOTE — PROGRESS NOTES
WEEKLY MANAGEMENT NOTE  Radiation Oncology          Patient Name: Dakota Myers  MRN: [id number]       Pelvis Current Dose: 6600/7000  cGy Fractions: 33/35          DAILY DOSE:     200  cGy/ day,  5 times/week      DISEASE UNDER TREATMENT: Adenocarcinoma of the prostate, pT3a pN0 M0, R1, [place] 4+3=7, s/p radical prostatectomy and bilateral pelvic lymphadenectomy(5/9/2023), PSA 0.42(7/13/2023)     ADT: Lupron, 22.5 mg, 6M (7/19/2023)    SUBJECTIVE: 72 yo man undergoing salvage radiotherapy for prostate cancer. The patient is experiencing hot flashes.  Also developed diarrhea; this is stable but requires ongoing Imodium use. He is urinating 2-3 times per night, which is also stable.  He is otherwise doing well.    CTC V5.0 Toxicity Criteria  Fatigue: Grade 1: Fatigue relieved by rest  Pain Score:  0/10    :   Urinary Frequency/Urgency: Grade 1: Increase in frequency or nocturia up to 2X normal  Urinary Incontinence: Grade 0: No change from baseline  Dysuria:Grade 0: No change from baseline  Nocturia: 1-2 (baseline 2)    GI:  Diarrhea:Grade 1  Increase of <4 stools/day over baseline  Proctitis: Grade 0 No symptom  Comment: Has intermittent diarrhea. This is sometimes aggravated by stress. He has taken Immodium pill qoD with some relief      OBJECTIVE: There were no vitals taken for this visit.   Wt Readings from Last 2 Encounters:   09/19/23 94.8 kg (209 lb)   09/12/23 92.5 kg (204 lb)   Comment: Mr. Myers reports elevated BP in the hospital setting, lower at home and with PCP      IMPRESSION: The patient is tolerating the treatment.  The patient set up, dose, and cone beam CT images were reviewed.      PLAN: Complete radiotherapy with follow up  in 2 M after EOT with a PSA.  This has been scheduled.    PAIN MANAGEMENT PLAN: The patient does not require pain management    Lena Cleaning MD    Department of Radiation Oncology    Covering for primary Radiation Oncologist Dr. Joseph Lockhart

## 2023-09-19 NOTE — LETTER
9/19/2023         RE: Dakota Myers  [address] [facility] [address]        Dear Colleague,    Thank you for referring your patient, Dakota Myers, to the [facility] RADIATION ONCOLOGY. Please see a copy of my visit note below.    WEEKLY MANAGEMENT NOTE  Radiation Oncology          Patient Name: Dakota Myers  MRN: [id number]       Pelvis Current Dose: 6600/7000  cGy Fractions: 33/35          DAILY DOSE:     200  cGy/ day,  5 times/week      DISEASE UNDER TREATMENT: Adenocarcinoma of the prostate, pT3a pN0 M0, R1, [place] 4+3=7, s/p radical prostatectomy and bilateral pelvic lymphadenectomy(5/9/2023), PSA 0.42(7/13/2023)     ADT: Lupron, 22.5 mg, 6M (7/19/2023)    SUBJECTIVE: 74 yo man undergoing salvage radiotherapy for prostate cancer. The patient is experiencing hot flashes.  Also developed diarrhea; this is stable but requires ongoing Imodium use. He is urinating 2-3 times per night, which is also stable.  He is otherwise doing well.    CTC V5.0 Toxicity Criteria  Fatigue: Grade 1: Fatigue relieved by rest  Pain Score:  0/10    :   Urinary Frequency/Urgency: Grade 1: Increase in frequency or nocturia up to 2X normal  Urinary Incontinence: Grade 0: No change from baseline  Dysuria:Grade 0: No change from baseline  Nocturia: 1-2 (baseline 2)    GI:  Diarrhea:Grade 1  Increase of <4 stools/day over baseline  Proctitis: Grade 0 No symptom  Comment: Has intermittent diarrhea. This is sometimes aggravated by stress. He has taken Immodium pill qoD with some relief      OBJECTIVE: There were no vitals taken for this visit.   Wt Readings from Last 2 Encounters:   09/19/23 94.8 kg (209 lb)   09/12/23 92.5 kg (204 lb)   Comment: Mr. Myers reports elevated BP in the hospital setting, lower at home and with PCP      IMPRESSION: The patient is tolerating the treatment.  The patient set up, dose, and cone beam CT images were reviewed.      PLAN: Complete radiotherapy with follow up  in 2 M after EOT  with a PSA.  This has been scheduled.    PAIN MANAGEMENT PLAN: The patient does not require pain management    Lena Cleaning MD    Department of Radiation Oncology    Covering for primary Radiation Oncologist Dr. Joseph Lockhart      Again, thank you for allowing me to participate in the care of your patient.        Sincerely,        Lena Cleaning MD

## 2023-09-20 ENCOUNTER — APPOINTMENT (OUTPATIENT)
Dept: RADIATION ONCOLOGY | Facility: CLINIC | Age: 63
End: 2023-09-20
Attending: RADIOLOGY
Payer: COMMERCIAL

## 2023-09-20 PROCEDURE — 77385 HC IMRT TREATMENT DELIVERY, SIMPLE: CPT | Performed by: RADIOLOGY

## 2023-09-20 PROCEDURE — 77336 RADIATION PHYSICS CONSULT: CPT | Performed by: RADIOLOGY

## 2023-09-20 PROCEDURE — 77427 RADIATION TX MANAGEMENT X5: CPT | Performed by: RADIOLOGY

## 2023-09-20 PROCEDURE — 77014 PR CT GUIDE FOR PLACEMENT RADIATION THERAPY FIELDS: CPT | Mod: 26 | Performed by: RADIOLOGY

## 2023-09-21 ENCOUNTER — APPOINTMENT (OUTPATIENT)
Dept: RADIATION ONCOLOGY | Facility: CLINIC | Age: 63
End: 2023-09-21
Attending: RADIOLOGY
Payer: COMMERCIAL

## 2023-09-21 PROCEDURE — 77014 PR CT GUIDE FOR PLACEMENT RADIATION THERAPY FIELDS: CPT | Mod: 26 | Performed by: RADIOLOGY

## 2023-09-21 PROCEDURE — 77385 HC IMRT TREATMENT DELIVERY, SIMPLE: CPT | Performed by: RADIOLOGY

## 2023-09-28 ENCOUNTER — ONCOLOGY VISIT (OUTPATIENT)
Dept: RADIATION ONCOLOGY | Facility: CLINIC | Age: 63
End: 2023-09-28

## 2023-09-28 NOTE — Clinical Note
9/28/2023         RE: Dakota Myers  [address] [facility] [address]        Dear Colleague,    Thank you for referring your patient, Dakota Myers, to the [facility] RADIATION ONCOLOGY. Please see a copy of my visit note below.    No notes on file    Again, thank you for allowing me to participate in the care of your patient.        Sincerely,        Joseph Lockhart MD

## 2023-09-29 NOTE — PROCEDURES
Radiation Oncology:  [facility] [address] [place] [address]        Radiotherapy Treatment Summary  Date of Report: 2023             PATIENT: ROX BO  MEDICAL RECORD NO: [id number]  : 1960        DIAGNOSIS: C61 Malignant neoplasm of prostate     INTENT OF RADIOTHERAPY: Cure     PATHOLOGY:  Adenocarcinoma                                  STAGE:  Adenocarcinoma of the prostate, pT3a pN0 M0, R1, [place] 4+3=7, s/p radical   prostatectomy and bilateral pelvic lymphadenectomy(2023), PSA 0.42(2023)      Details of the treatments summarized below are found in records kept in the Department of Radiation Oncology @ [facility].     Treatment Summary:  Radiation Oncology - Course: 1 Protocol:   Treatment Site  Current Dose Modality From To Elapsed Days Fx.  1 Pelvis   4,600 cGy 10 X  7/31/2023  2023  36  23  1 Pelvis boost   2,400 cGy 10 X  9/06/2023  2023  15  12                Total Dose (cGy): 7000  Dose per fraction (cGy): 200  Radiation Modality: IMRT-VMAT                          COMMENTS:   The patient is a 64 yo man  who completed salvage radiotherapy for prostate cancer. The patient   underwent robotic assisted laparoscopic radical prostatectomy and bilateral pelvic lymphadenectomy   (23). Surgical pathology ([id number]) showed prostatic acinar adenocarcinoma and ductal   adenocarcinoma, grade 3, [place] 7 (4+3), with cribriform glands, with focal EPE in the right   posterior gland, with PNI, without bladder neck invasion, seminal vesicle invasion or LVSI. The right   apical and left apical margins were positive. All lymph nodes were negative 0/5. His stage was   [id number].     Follow up PSA was 0.37(7/3/2023) and the patient proceeded with salvage XRT without PET scan.     His maximum CTC v5.0 toxicities grades were:                  GI  Urinary frequency : 1  Diarrhea: 1  Urinary incontinence:  0  Proctitis: 0  Dysuria: 0     Nocturia: 2         FOLLOW UP PLAN:   Follow up in 2 months with a PSA     Staff Physician: MINAL Lockhart M.D.

## 2023-10-25 ENCOUNTER — INFUSION THERAPY VISIT (OUTPATIENT)
Dept: ONCOLOGY | Facility: CLINIC | Age: 63
End: 2023-10-25
Attending: RADIOLOGY
Payer: COMMERCIAL

## 2023-10-25 VITALS
RESPIRATION RATE: 20 BRPM | HEART RATE: 74 BPM | WEIGHT: 207.2 LBS | OXYGEN SATURATION: 93 % | SYSTOLIC BLOOD PRESSURE: 138 MMHG | DIASTOLIC BLOOD PRESSURE: 82 MMHG | BODY MASS INDEX: 26.6 KG/M2 | TEMPERATURE: 98.1 F

## 2023-10-25 DIAGNOSIS — C61 PROSTATE CANCER (H): Primary | ICD-10-CM

## 2023-10-25 PROCEDURE — 250N000011 HC RX IP 250 OP 636: Mod: JZ | Performed by: RADIOLOGY

## 2023-10-25 PROCEDURE — 96402 CHEMO HORMON ANTINEOPL SQ/IM: CPT

## 2023-10-25 RX ADMIN — LEUPROLIDE ACETATE 22.5 MG: KIT at 08:45

## 2023-10-25 ASSESSMENT — PAIN SCALES - GENERAL: PAINLEVEL: NO PAIN (0)

## 2023-10-25 NOTE — PROGRESS NOTES
Infusion Injection Note:  Dakota Myers presents today for Lupron injection.    Patient not seen by a provider today.    Patient stated he is feeling well and has no questions or concerns today - declined to speak with an RN today.     Treatment Conditions:  No labs drawn prior to infusion appointment. Not Applicable.     Pre and Post Injection:  Lupron injection given to Right Ventrogluteal without incident.   Patient tolerated procedure well.    Discharge Plan:  Patient verbalized understanding of discharge instructions and all questions answered. AVS to patient via [email or web address].    Patient discharged in stable condition accompanied by: self.  Departure Mode: Ambulatory.    Debra FERNANDES on 10/25/2023 at 9:01 AM

## 2023-11-21 ENCOUNTER — MYC MEDICAL ADVICE (OUTPATIENT)
Dept: FAMILY MEDICINE | Facility: CLINIC | Age: 63
End: 2023-11-21

## 2023-11-22 ENCOUNTER — NURSE TRIAGE (OUTPATIENT)
Dept: FAMILY MEDICINE | Facility: CLINIC | Age: 63
End: 2023-11-22
Payer: COMMERCIAL

## 2023-11-22 DIAGNOSIS — U07.1 INFECTION DUE TO 2019 NOVEL CORONAVIRUS: Primary | ICD-10-CM

## 2023-11-22 NOTE — TELEPHONE ENCOUNTER
RN COVID TREATMENT VISIT  11/22/23      The patient has been triaged and does not require a higher level of care.    Dakota Myers  63 year old  Current weight? 205    Has the patient been seen by a primary care provider at an [facility] or [facility] within the past two years? Yes.   Have you been in close proximity to/do you have a known exposure to a person with a confirmed case of influenza? No.     General treatment eligibility:  Date of positive COVID test (PCR or at home)?  11/21    Are you or have you been hospitalized for this COVID-19 infection? No.   Have you received monoclonal antibodies or antiviral treatment for COVID-19 since this positive test? No.   Do you have any of the following conditions that place you at risk of being very sick from COVID-19?   - Age 50 years or older  - Cancer/active malignancy including patients with cancer who are currently receiving chemotherapy or radiation, metastatic cancer, advance cancer and are receiving palliative care  Yes, patient has at least one high risk condition as noted above.     Current COVID symptoms:   - fatigue  - muscle or body aches  - headache  - congestion or runny nose  Yes. Patient has at least one symptom as selected.     How many days since symptoms started? 5 days or less. Established patient, 12 years or older weighing at least 88.2 lbs, who has symptoms that started in the past 5 days, has not been hospitalized nor received treatment already, and is at risk for being very sick from COVID-19.     Treatment eligibility by RN:  Are you currently pregnant or nursing? No  Do you have a clinically significant hypersensitivity to nirmatrelvir or ritonavir, or toxic epidermal necrolysis (TEN) or Rock-Brandyn Syndrome? No  Do you have a history of hepatitis, any hepatic impairment on the Problem List (such as Child-Robins Class C, cirrhosis, fatty liver disease, alcoholic liver disease), or was the last liver lab  (hepatic panel, ALT, AST, ALK Phos, bilirubin) elevated in the past 6 months? No  Do you have any history of severe renal impairment (eGFR < 30mL/min)? No    Is patient eligible to continue? Yes, patient meets all eligibility requirements for the RN COVID treatment (as denoted by all no responses above).     Current Outpatient Medications   Medication Sig Dispense Refill    Acetaminophen (TYLENOL 8 HOUR ARTHRITIS PAIN PO) Take 1 tablet by mouth 2 times daily      diazepam (VALIUM) 2 MG tablet Take 1 tablet (2 mg) by mouth every 12 hours as needed (overwhelming anxiety/worry) (Patient not taking: Reported on 10/25/2023) 10 tablet 0    tadalafil (CIALIS) 5 MG tablet Take 1 tablet (5 mg) by mouth every 24 hours 30 tablet 5       Medications from List 1 of the standing order (on medications that exclude the use of Paxlovid) that patient is taking: NONE. Is patient taking Boyd's Wort? No  Is patient taking [place]'s Wort or any meds from List 1? No.   Medications from List 2 of the standing order (on meds that provider needs to adjust) that patient is taking: NONE. Is patient on any of the meds from List 2? No.   Medications from List 3 of standing order (on meds that a RN needs to adjust) that patient is taking: diazepam (Valium, Diastat): Is patient taking as needed and less than daily? Yes, instructed patient to stop taking diazepam while taking Paxlovid and restart diazepam 3 days after the completion of Paxlovid.  tadalafil (Adcirca, Alyq, Cialis, Tadliq): Is patient using for erectile dysfunction? Yes, instructed patient to stop taking tadalafil while taking Paxlovid and restart tadalafil 3 days after the completion of Paxlovid. Is patient on any meds from List 3? Yes. Patient is on meds from list 3. No meds require a provider visit and at least one med required RN to adjust.     Paxlovid has an approximate 90% reduction in hospitalization. Paxlovid can possibly cause altered sense of taste, diarrhea (loose,  watery stools), high blood pressure, muscle aches.     Would patient like a Paxlovid prescription?   Yes.   Lab Results   Component Value Date    GFRESTIMATED 69 05/10/2023       Was last eGFR reduced? No, eGFR 60 or greater/ No Result on record. Patient can receive the normal renal function dose. Paxlovid Rx sent to [place] pharmacy   [facility]    Temporary change to home medications: diazepam (Valium, Diastat): Is patient taking as needed and less than daily? Yes, instructed patient to stop taking diazepam while taking Paxlovid and restart diazepam 3 days after the completion of Paxlovid.  tadalafil (Adcirca, Alyq, Cialis, Tadliq): Is patient using for erectile dysfunction? Yes, instructed patient to stop taking tadalafil while taking Paxlovid and restart tadalafil 3 days after the completion of Paxlovid.  Patient states he has not taken either medication in months but is aware not to begin until 3 days after completion of Paxlovid.      All medication adjustments (holds, etc) were discussed with the patient and patient was asked to repeat back (teachback) their med adjustment.  Did patient understand med adjustment? Yes, patient repeated back and understood correctly.        Reviewed the following instructions with the patient:    Paxlovid (nimatrelvir and ritonavir)    How it works  Two medicines (nirmatrelvir and ritonavir) are taken together. They stop the virus from growing. Less amount of virus is easier for your body to fight.    How to take  Medicine comes in a daily container with both medicine tablets. Take by mouth twice daily (once in the morning, once at night) for 5 days.  The number of tablets to take varies by patient.  Don't chew or break capsules. Swallow whole.    When to take  Take as soon as possible after positive COVID-19 test result, and within 5 days of your first symptoms.    Possible side effects  Can cause altered sense of taste, diarrhea (loose, watery stools), high blood  pressure, muscle aches.    Celia Magdaleno RN        Reason for Disposition   [1] HIGH RISK patient (e.g., weak immune system, age > 64 years, obesity with BMI 30 or higher, pregnant, chronic lung disease or other chronic medical condition) AND [2] COVID symptoms (e.g., cough, fever)  (Exceptions: Already seen by PCP and no new or worsening symptoms.)    Additional Information   Negative: SEVERE difficulty breathing (e.g., struggling for each breath, speaks in single words)   Negative: Difficult to awaken or acting confused (e.g., disoriented, slurred speech)   Negative: Bluish (or gray) lips or face now   Negative: Shock suspected (e.g., cold/pale/clammy skin, too weak to stand, low BP, rapid pulse)   Negative: Sounds like a life-threatening emergency to the triager   Negative: [1] Diagnosed or suspected COVID-19 AND [2] symptoms lasting 3 or more weeks   Negative: [1] COVID-19 exposure AND [2] no symptoms   Negative: COVID-19 vaccine reaction suspected (e.g., fever, headache, muscle aches) occurring 1 to 3 days after getting vaccine   Negative: COVID-19 vaccine, questions about   Negative: [1] Lives with someone known to have influenza (flu test positive) AND [2] flu-like symptoms (e.g., cough, runny nose, sore throat, SOB; with or without fever)   Negative: [1] Possible COVID-19 symptoms AND [2] triager concerned about severity of symptoms or other causes   Negative: COVID-19 and breastfeeding, questions about   Negative: SEVERE or constant chest pain or pressure  (Exception: Mild central chest pain, present only when coughing.)   Negative: MODERATE difficulty breathing (e.g., speaks in phrases, SOB even at rest, pulse 100-120)   Negative: [1] Headache AND [2] stiff neck (can't touch chin to chest)   Negative: Oxygen level (e.g., pulse oximetry) 90 percent or lower   Negative: Chest pain or pressure  (Exception: MILD central chest pain, present only when coughing.)   Negative: [1] Drinking very little AND [2]  dehydration suspected (e.g., no urine > 12 hours, very dry mouth, very lightheaded)   Negative: Patient sounds very sick or weak to the triager   Negative: MILD difficulty breathing (e.g., minimal/no SOB at rest, SOB with walking, pulse <100)   Negative: Fever > 103 F (39.4 C)   Negative: [1] Fever > 101 F (38.3 C) AND [2] age > 60 years   Negative: [1] Fever > 100.0 F (37.8 C) AND [2] bedridden (e.g., CVA, chronic illness, recovering from surgery)   Negative: Oxygen level (e.g., pulse oximetry) 91 to 94 percent    Protocols used: Coronavirus (COVID-19) Diagnosed or [id number]

## 2023-11-28 ENCOUNTER — LAB (OUTPATIENT)
Dept: LAB | Facility: CLINIC | Age: 63
End: 2023-11-28
Payer: COMMERCIAL

## 2023-11-28 DIAGNOSIS — C61 PROSTATE CANCER (H): ICD-10-CM

## 2023-11-28 LAB — PSA SERPL DL<=0.01 NG/ML-MCNC: 0.09 NG/ML (ref 0–4.5)

## 2023-11-28 PROCEDURE — 36415 COLL VENOUS BLD VENIPUNCTURE: CPT | Performed by: PATHOLOGY

## 2023-11-28 PROCEDURE — 84153 ASSAY OF PSA TOTAL: CPT | Performed by: PATHOLOGY

## 2023-11-29 ENCOUNTER — PATIENT OUTREACH (OUTPATIENT)
Dept: CARE COORDINATION | Facility: CLINIC | Age: 63
End: 2023-11-29
Payer: COMMERCIAL

## 2023-11-29 ENCOUNTER — OFFICE VISIT (OUTPATIENT)
Dept: RADIATION ONCOLOGY | Facility: CLINIC | Age: 63
End: 2023-11-29
Attending: RADIOLOGY
Payer: COMMERCIAL

## 2023-11-29 VITALS
HEART RATE: 97 BPM | SYSTOLIC BLOOD PRESSURE: 135 MMHG | WEIGHT: 210.4 LBS | DIASTOLIC BLOOD PRESSURE: 85 MMHG | BODY MASS INDEX: 27.01 KG/M2

## 2023-11-29 DIAGNOSIS — C61 PROSTATE CANCER (H): Primary | ICD-10-CM

## 2023-11-29 PROCEDURE — 99214 OFFICE O/P EST MOD 30 MIN: CPT | Mod: 27 | Performed by: RADIOLOGY

## 2023-11-29 PROCEDURE — 99024 POSTOP FOLLOW-UP VISIT: CPT | Mod: GC | Performed by: RADIOLOGY

## 2023-11-29 PROCEDURE — G0463 HOSPITAL OUTPT CLINIC VISIT: HCPCS

## 2023-11-29 RX ORDER — VENLAFAXINE HYDROCHLORIDE 75 MG/1
75 CAPSULE, EXTENDED RELEASE ORAL DAILY
Qty: 30 CAPSULE | Refills: 1 | Status: SHIPPED | OUTPATIENT
Start: 2023-11-29

## 2023-11-29 RX ORDER — TADALAFIL 20 MG/1
20 TABLET ORAL DAILY PRN
Qty: 6 TABLET | Refills: 11 | Status: SHIPPED | OUTPATIENT
Start: 2023-11-29

## 2023-11-29 NOTE — LETTER
11/29/2023         RE: Dakota Myers  [address] [facility] [address]        Dear Colleague,    Thank you for referring your patient, Dakota Myers, to the [facility] RADIATION ONCOLOGY. Please see a copy of my visit note below.    RADIATION ONCOLOGY FOLLOW UP NOTE      DATE OF FOLLOW UP: 11/29/2023     PATIENT NAME: Dakota Myers    DISEASE TREATED: Adenocarcinoma of the prostate, pT3a N0 M0, R1, [place] 4+3=7, s/p radical prostatectomy and bilateral pelvic lymphadenectomy(5/9/2023), PSA 0.42(7/13/2023)            INTERVAL SINCE COMPLETION OF RADIOTHERAPY: 2 months (completed on 9/21/2023).     TYPE OF RADIOTHERAPY GIVEN: Salvage radiotherapy   7000 cGy in 35 Fractions IMRT           ADT: Lupron, 22.5 mg, 6M (7/19/2023, 10/25/2023)     PSA  11/28/2023 0.09  09/21/2023  completed salvage XRT  07/13/2023 0.42  07/03/2023 0.37   05/09/2023 RALP  12/29/2022 21.9    SUBJECTIVE:    The patient is a 64 yo man  who completed salvage radiotherapy for prostate cancer. The patient underwent robotic assisted laparoscopic radical prostatectomy and bilateral pelvic lymphadenectomy (5-9-23). Surgical pathology ([id number]) showed prostatic acinar adenocarcinoma and ductal adenocarcinoma, grade 3, [place] 7 (4+3), with cribriform glands, with focal EPE in the right posterior gland, with PNI, without bladder neck invasion, seminal vesicle invasion or LVSI. The right apical and left apical margins were positive. All lymph nodes were negative 0/5. His stage was [id number].     Follow up PSA was 0.37 (7/3/2023) and the patient proceeded with salvage XRT without PET scan. His maximum CTC v5.0 toxicities grades were:               Urinary frequency : 1  Diarrhea: 1  Urinary incontinence:  0  Proctitis: 0  Dysuria: 0    Today, the patient was seen in clinic. His AUA is 8, TAMI score is 16 on Cialis. He has frequent stools but no diarrhea. He still has nocturia 3x per night, he says this is stable to slightly  improving. He is using low dose Cialis daily.The patient does express that he is having significant hot flashes, up to 7x per day. He would like to explore pharmaceutical options for his hot flashes.    OBJECTIVE: /85   Pulse 97   Wt 95.4 kg (210 lb 6.4 oz)   BMI 27.01 kg/m      IMAGING: No new imaging    CURRENT ZUBROD PERFORMANCE STATUS:  0      IMPRESSION OF DISEASE STATUS: Clinical Partial Response(>30% but less than Complete Response)    RECOMMENDATION: Follow up in 6 months with a PSA prior to visit. We do have some concern that his PSA is not undetectable despite RT and ADT after prostatectomy, and will follow. We will also check testosterone with next PSA. We will discontinue his daily Cialis and prescribe higher dose Cialis to use as needed. We also will prescribe Effexor for his hot flashes.    Marlin Gomes M.D. M.S. PGY2  Department of Radiation Oncology  [facility]  [phone]         FOLLOW-UP VISIT    Patient Name: Dakota Myers      : 1960     Age: 63 year old        ______________________________________________________________________________     Chief Complaint   Patient presents with    Prostate Cancer     2 month fup for rad therapy to prostate bed     /85   Pulse 97   Wt 95.4 kg (210 lb 6.4 oz)   BMI 27.01 kg/m       Date Radiation Completed: 2023    Pain  Denies    Labs  Other Labs: Yes: PSA  0.09    Imaging  None        PSA:   Prostate Specific Antigen Screen   Date Value Ref Range Status   2022 21.90 (H) 0.00 - 4.50 ng/mL Final     PSA Tumor Marker   Date Value Ref Range Status   2023 0.09 0.00 - 4.50 ng/mL Final   2023 0.42 0.00 - 4.50 ng/mL Final   2023 0.37 0.00 - 4.50 ng/mL Final     Testosterone Level:   Testosterone Total   Date Value Ref Range Status   2023 249 240 - 950 ng/dL Final       On-Study AUA Symptom Score (PQ)  AUA (American Urological Association) Symptom Score  1. Over the past  month, how often have you had a sensation of not emptying your bladder completely after you finished urinating?: Not at all  2. Over the past month, how often have you had to urinate again less than two hours after you finished urinating?: Less than half the time  3. Over the past month, how often have you found you stopped and started again several times when you urinated?: Not at all  4. Over the past month, how often have you found it difficult to postpone urination?: About half the time  5. Over the past month, how often have you had a weak urine stream?: Not at all  6. Over the past month, how often have you had to push or strain to begin urinating?: Not at all  7. Over the past month, how many times did you typically get up to urinate from the time you went to bed at night until the time you got up in the morning?: 3 times  AUA Score: 8    Diarrhea: 0- None    Constipation: 0- None    Other Appointments:     MD Name: Drake Appointment Date:    MD Name: Appointment Date:   MD Name: Appointment Date:   Other Appointment Notes:     Residual Radiation side effect: none     Additional Instructions:     Nurse face-to-face time: Level 4:  15 min face to face time            Again, thank you for allowing me to participate in the care of your patient.        Sincerely,        Joseph Lockhart MD

## 2023-11-29 NOTE — PROGRESS NOTES
RADIATION ONCOLOGY FOLLOW UP NOTE      DATE OF FOLLOW UP: 11/29/2023     PATIENT NAME: Dakota Myers    DISEASE TREATED: Adenocarcinoma of the prostate, pT3a N0 M0, R1, [place] 4+3=7, s/p radical prostatectomy and bilateral pelvic lymphadenectomy(5/9/2023), PSA 0.42(7/13/2023)            INTERVAL SINCE COMPLETION OF RADIOTHERAPY: 2 months (completed on 9/21/2023).     TYPE OF RADIOTHERAPY GIVEN: Salvage radiotherapy   7000 cGy in 35 Fractions IMRT           ADT: Lupron, 22.5 mg, 6M (7/19/2023, 10/25/2023)     PSA  11/28/2023 0.09  09/21/2023  completed salvage XRT  07/13/2023 0.42  07/03/2023 0.37   05/09/2023 RALP  12/29/2022 21.9    SUBJECTIVE:    The patient is a 62 yo man  who completed salvage radiotherapy for prostate cancer. The patient underwent robotic assisted laparoscopic radical prostatectomy and bilateral pelvic lymphadenectomy (5-9-23). Surgical pathology ([id number]) showed prostatic acinar adenocarcinoma and ductal adenocarcinoma, grade 3, [place] 7 (4+3), with cribriform glands, with focal EPE in the right posterior gland, with PNI, without bladder neck invasion, seminal vesicle invasion or LVSI. The right apical and left apical margins were positive. All lymph nodes were negative 0/5. His stage was [id number].     Follow up PSA was 0.37 (7/3/2023) and the patient proceeded with salvage XRT without PET scan. His maximum CTC v5.0 toxicities grades were:               Urinary frequency : 1  Diarrhea: 1  Urinary incontinence:  0  Proctitis: 0  Dysuria: 0    Today, the patient was seen in clinic. His AUA is 8, TAMI score is 16 on Cialis. He has frequent stools but no diarrhea. He still has nocturia 3x per night, he says this is stable to slightly improving. He is using low dose Cialis daily.The patient does express that he is having significant hot flashes, up to 7x per day. He would like to explore pharmaceutical options for his hot flashes.    OBJECTIVE: /85   Pulse 97   Wt 95.4 kg (210 lb 6.4  oz)   BMI 27.01 kg/m      IMAGING: No new imaging    CURRENT ZUBROD PERFORMANCE STATUS:  0      IMPRESSION OF DISEASE STATUS: Clinical Partial Response(>30% but less than Complete Response)    RECOMMENDATION: Follow up in 6 months with a PSA prior to visit. We do have some concern that his PSA is not undetectable despite RT and ADT after prostatectomy, and will follow. We will also check testosterone with next PSA. We will discontinue his daily Cialis and prescribe higher dose Cialis to use as needed. We also will prescribe Effexor for his hot flashes.    Marlin Gomes M.D. M.S. PGY2  Department of Radiation Oncology  [facility]  [phone]

## 2023-11-29 NOTE — PROGRESS NOTES
FOLLOW-UP VISIT    Patient Name: Dakota Myers      : 1960     Age: 63 year old        ______________________________________________________________________________     Chief Complaint   Patient presents with    Prostate Cancer     2 month fup for rad therapy to prostate bed     /85   Pulse 97   Wt 95.4 kg (210 lb 6.4 oz)   BMI 27.01 kg/m       Date Radiation Completed: 2023    Pain  Denies    Labs  Other Labs: Yes: PSA  0.09    Imaging  None        PSA:   Prostate Specific Antigen Screen   Date Value Ref Range Status   2022 21.90 (H) 0.00 - 4.50 ng/mL Final     PSA Tumor Marker   Date Value Ref Range Status   2023 0.09 0.00 - 4.50 ng/mL Final   2023 0.42 0.00 - 4.50 ng/mL Final   2023 0.37 0.00 - 4.50 ng/mL Final     Testosterone Level:   Testosterone Total   Date Value Ref Range Status   2023 249 240 - 950 ng/dL Final       On-Study AUA Symptom Score (PQ)  AUA (American Urological Association) Symptom Score  1. Over the past month, how often have you had a sensation of not emptying your bladder completely after you finished urinating?: Not at all  2. Over the past month, how often have you had to urinate again less than two hours after you finished urinating?: Less than half the time  3. Over the past month, how often have you found you stopped and started again several times when you urinated?: Not at all  4. Over the past month, how often have you found it difficult to postpone urination?: About half the time  5. Over the past month, how often have you had a weak urine stream?: Not at all  6. Over the past month, how often have you had to push or strain to begin urinating?: Not at all  7. Over the past month, how many times did you typically get up to urinate from the time you went to bed at night until the time you got up in the morning?: 3 times  AUA Score: 8    Diarrhea: 0- None    Constipation: 0- None    Other Appointments:     MD Name: Drake Giron  Date:    MD Name: Appointment Date:   MD Name: Appointment Date:   Other Appointment Notes:     Residual Radiation side effect: none     Additional Instructions:     Nurse face-to-face time: Level 4:  15 min face to face time

## 2023-12-13 ENCOUNTER — PATIENT OUTREACH (OUTPATIENT)
Dept: CARE COORDINATION | Facility: CLINIC | Age: 63
End: 2023-12-13
Payer: COMMERCIAL

## 2024-03-17 ENCOUNTER — HEALTH MAINTENANCE LETTER (OUTPATIENT)
Age: 64
End: 2024-03-17

## 2024-04-22 ENCOUNTER — OFFICE VISIT (OUTPATIENT)
Dept: URGENT CARE | Facility: URGENT CARE | Age: 64
End: 2024-04-22
Payer: COMMERCIAL

## 2024-04-22 VITALS
HEIGHT: 74 IN | BODY MASS INDEX: 26.31 KG/M2 | RESPIRATION RATE: 14 BRPM | DIASTOLIC BLOOD PRESSURE: 88 MMHG | TEMPERATURE: 97.1 F | OXYGEN SATURATION: 99 % | WEIGHT: 205 LBS | SYSTOLIC BLOOD PRESSURE: 144 MMHG | HEART RATE: 70 BPM

## 2024-04-22 DIAGNOSIS — R05.1 ACUTE COUGH: Primary | ICD-10-CM

## 2024-04-22 DIAGNOSIS — R06.2 WHEEZING: ICD-10-CM

## 2024-04-22 PROCEDURE — 99214 OFFICE O/P EST MOD 30 MIN: CPT | Performed by: FAMILY MEDICINE

## 2024-04-22 RX ORDER — DOXYCYCLINE HYCLATE 100 MG
100 TABLET ORAL 2 TIMES DAILY
Qty: 14 TABLET | Refills: 0 | Status: SHIPPED | OUTPATIENT
Start: 2024-04-22 | End: 2024-04-29

## 2024-04-22 RX ORDER — ALBUTEROL SULFATE 90 UG/1
2 AEROSOL, METERED RESPIRATORY (INHALATION) EVERY 6 HOURS PRN
Qty: 18 G | Refills: 0 | Status: SHIPPED | OUTPATIENT
Start: 2024-04-22

## 2024-04-22 NOTE — PATIENT INSTRUCTIONS
If cough has not improved by the end of the week start the doxycycline antibiotic to cover for atypical organisms including walking pneumonia      If your symptoms worsen at any point please seek help right away      If you feel tight with your breathing take 2 puffs of the albuterol every 6 hours as needed
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
## 2024-05-22 ENCOUNTER — LAB (OUTPATIENT)
Dept: LAB | Facility: CLINIC | Age: 64
End: 2024-05-22
Payer: COMMERCIAL

## 2024-05-22 DIAGNOSIS — C61 PROSTATE CANCER (H): ICD-10-CM

## 2024-05-22 LAB — PSA SERPL DL<=0.01 NG/ML-MCNC: <0.01 NG/ML (ref 0–4.5)

## 2024-05-22 PROCEDURE — 36415 COLL VENOUS BLD VENIPUNCTURE: CPT | Performed by: PATHOLOGY

## 2024-05-22 PROCEDURE — 84403 ASSAY OF TOTAL TESTOSTERONE: CPT | Performed by: RADIOLOGY

## 2024-05-22 PROCEDURE — 99000 SPECIMEN HANDLING OFFICE-LAB: CPT | Performed by: PATHOLOGY

## 2024-05-22 PROCEDURE — 84153 ASSAY OF PSA TOTAL: CPT | Performed by: PATHOLOGY

## 2024-05-24 LAB — TESTOST SERPL-MCNC: 291 NG/DL (ref 240–950)

## 2024-05-28 NOTE — PROGRESS NOTES
RADIATION ONCOLOGY FOLLOW UP NOTE      DATE OF FOLLOW UP: 5/29/2024      PATIENT NAME: Dakota Myers    DISEASE TREATED: Adenocarcinoma of the prostate, pT3a N0 M0, R1, [place] 4+3=7, s/p radical prostatectomy and bilateral pelvic lymphadenectomy(5/9/2023), PSA 0.42 (7/13/2023)            INTERVAL SINCE COMPLETION OF RADIOTHERAPY: 8 months (completed on 9/21/2023).     TYPE OF RADIOTHERAPY GIVEN: Salvage radiotherapy   7000 cGy in 35 Fractions IMRT(4600 cGy to pelvis) then boost to prostate bed.           ADT: Lupron, 22.5 mg, 6M (7/19/2023, 10/25/2023)     PSA  05/22/2014 <0.01  11/28/2023 0.09  09/21/2023  completed salvage XRT  07/13/2023 0.42  07/03/2023 0.37   05/09/2023 RALP  12/29/2022 21.9    SUBJECTIVE:    The patient is a 63 yo man  who completed salvage radiotherapy for prostate cancer. The patient underwent robotic assisted laparoscopic radical prostatectomy and bilateral pelvic lymphadenectomy (5-9-23). Surgical pathology ([id number]) showed prostatic acinar adenocarcinoma and ductal adenocarcinoma, grade 3, [place] 7 (4+3), with cribriform glands, with focal EPE in the right posterior gland, with PNI, without bladder neck invasion, seminal vesicle invasion or LVSI. The right apical and left apical margins were positive. All lymph nodes were negative 0/5. His stage was [id number], R1.     The first post op PSA was 0.37 (7/3/2023, 2 months post RALP) and the patient proceeded with salvage XRT without PET scan. The patient received 7000 cGy in 35 fractions using IMRT directed at the pelvic lymph nodes (4600 cGy) and the prostate bed.    Today, the patient was seen in clinic. His AUA sx score is 8/35, TAMI score is 6/25. He has frequent stools. He still has nocturia 1-2 x per night. He reported that Effexor has helped with hot flashes and it is also getting less frequent.    OBJECTIVE: There were no vitals taken for this visit.    CURRENT ZUBROD PERFORMANCE STATUS:  0      IMPRESSION OF DISEASE STATUS:  Clinical No Evidence of Disease(ALYCIA)    RECOMMENDATION: Follow up in 6 months with a PSA prior to visit.The patient will have next and subsequent follow up visits with our LUIS/NP via telephone. The patient has been instructed to call with any questions in interim.     MINAL Lockhart M.D.  Department of Radiation Oncology  [facility]

## 2024-05-29 ENCOUNTER — OFFICE VISIT (OUTPATIENT)
Dept: RADIATION ONCOLOGY | Facility: CLINIC | Age: 64
End: 2024-05-29
Attending: RADIOLOGY
Payer: COMMERCIAL

## 2024-05-29 VITALS
SYSTOLIC BLOOD PRESSURE: 150 MMHG | WEIGHT: 220 LBS | BODY MASS INDEX: 28.25 KG/M2 | DIASTOLIC BLOOD PRESSURE: 90 MMHG | HEART RATE: 92 BPM

## 2024-05-29 DIAGNOSIS — C61 PROSTATE CANCER (H): Primary | ICD-10-CM

## 2024-05-29 PROCEDURE — 99212 OFFICE O/P EST SF 10 MIN: CPT | Performed by: RADIOLOGY

## 2024-05-29 PROCEDURE — 99213 OFFICE O/P EST LOW 20 MIN: CPT | Performed by: RADIOLOGY

## 2024-05-29 NOTE — PROGRESS NOTES
FOLLOW-UP VISIT    Patient Name: Dakota Myers      : 1960     Age: 64 year old        ______________________________________________________________________________     Chief Complaint   Patient presents with    Cancer     Follow up:Prostate Cancer: Pelvis 7000 cGy completed 23     BP (!) 150/90   Pulse 92   Wt 99.8 kg (220 lb)   BMI 28.25 kg/m       Pain  Denies    Labs  Other Labs: Yes: 24    Imaging  None        PSA:   Prostate Specific Antigen Screen   Date Value Ref Range Status   2022 21.90 (H) 0.00 - 4.50 ng/mL Final     PSA Tumor Marker   Date Value Ref Range Status   2024 <0.01 0.00 - 4.50 ng/mL Final   2023 0.09 0.00 - 4.50 ng/mL Final   2023 0.42 0.00 - 4.50 ng/mL Final   2023 0.37 0.00 - 4.50 ng/mL Final     Testosterone Level:   Testosterone Total   Date Value Ref Range Status   2024 291 240 - 950 ng/dL Final   2023 249 240 - 950 ng/dL Final       On-Study AUA Symptom Score (PQ)  AUA (American Urological Association) Symptom Score  1. Over the past month, how often have you had a sensation of not emptying your bladder completely after you finished urinating?: Less than half the time  2. Over the past month, how often have you had to urinate again less than two hours after you finished urinating?: Less than half the time  3. Over the past month, how often have you found you stopped and started again several times when you urinated?: Less than 1 time in 5  4. Over the past month, how often have you found it difficult to postpone urination?: Less than half the time  5. Over the past month, how often have you had a weak urine stream?: Not at all  6. Over the past month, how often have you had to push or strain to begin urinating?: Not at all  7. Over the past month, how many times did you typically get up to urinate from the time you went to bed at night until the time you got up in the morning?: 1 time  The Disease-specific Quality of Life  Question: If you were to spend the rest of your life with your urinary condition just the way it is now, how would you feel about that? (highlight or underline): Mixed  AUA Score: 8    Diarrhea: 0- None    Constipation: 0- None    Other Appointments:     MD Name:  Appointment Date:    MD Name: Appointment Date:   MD Name: Appointment Date:   Other Appointment Notes:     Residual Radiation side effect: Fatigue

## 2024-05-29 NOTE — LETTER
5/29/2024         RE: Dakota Myers  [address] [facility] [address]        Dear Colleague,    Thank you for referring your patient, Dakota Myers, to the [facility] RADIATION ONCOLOGY. Please see a copy of my visit note below.    RADIATION ONCOLOGY FOLLOW UP NOTE      DATE OF FOLLOW UP: 5/29/2024      PATIENT NAME: Dakota Myers    DISEASE TREATED: Adenocarcinoma of the prostate, pT3a N0 M0, R1, [place] 4+3=7, s/p radical prostatectomy and bilateral pelvic lymphadenectomy(5/9/2023), PSA 0.42 (7/13/2023)            INTERVAL SINCE COMPLETION OF RADIOTHERAPY: 8 months (completed on 9/21/2023).     TYPE OF RADIOTHERAPY GIVEN: Salvage radiotherapy   7000 cGy in 35 Fractions IMRT(4600 cGy to pelvis) then boost to prostate bed.           ADT: Lupron, 22.5 mg, 6M (7/19/2023, 10/25/2023)     PSA  05/22/2014 <0.01  11/28/2023 0.09  09/21/2023  completed salvage XRT  07/13/2023 0.42  07/03/2023 0.37   05/09/2023 RALP  12/29/2022 21.9    SUBJECTIVE:    The patient is a 63 yo man  who completed salvage radiotherapy for prostate cancer. The patient underwent robotic assisted laparoscopic radical prostatectomy and bilateral pelvic lymphadenectomy (5-9-23). Surgical pathology ([id number]) showed prostatic acinar adenocarcinoma and ductal adenocarcinoma, grade 3, Luz Elena 7 (4+3), with cribriform glands, with focal EPE in the right posterior gland, with PNI, without bladder neck invasion, seminal vesicle invasion or LVSI. The right apical and left apical margins were positive. All lymph nodes were negative 0/5. His stage was [id number], R1.     The first post op PSA was 0.37 (7/3/2023, 2 months post RALP) and the patient proceeded with salvage XRT without PET scan. The patient received 7000 cGy in 35 fractions using IMRT directed at the pelvic lymph nodes (4600 cGy) and the prostate bed.    Today, the patient was seen in clinic. His AUA sx score is 8/35, TAMI score is 6/25. He has frequent stools. He still  has nocturia 1-2 x per night. He reported that Effexor has helped with hot flashes and it is also getting less frequent.    OBJECTIVE: There were no vitals taken for this visit.    CURRENT ZUBROD PERFORMANCE STATUS:  0      IMPRESSION OF DISEASE STATUS: Clinical No Evidence of Disease(ALYCIA)    RECOMMENDATION: Follow up in 6 months with a PSA prior to visit.The patient will have next and subsequent follow up visits with our LUIS/NP via telephone. The patient has been instructed to call with any questions in interim.     MINAL Lockhart M.D.  Department of Radiation Oncology  [facility]              FOLLOW-UP VISIT    Patient Name: Dakota Myers      : 1960     Age: 64 year old        ______________________________________________________________________________     Chief Complaint   Patient presents with     Cancer     Follow up:Prostate Cancer: Pelvis 7000 cGy completed 23     BP (!) 150/90   Pulse 92   Wt 99.8 kg (220 lb)   BMI 28.25 kg/m       Pain  Denies    Labs  Other Labs: Yes: 24    Imaging  None        PSA:   Prostate Specific Antigen Screen   Date Value Ref Range Status   2022 21.90 (H) 0.00 - 4.50 ng/mL Final     PSA Tumor Marker   Date Value Ref Range Status   2024 <0.01 0.00 - 4.50 ng/mL Final   2023 0.09 0.00 - 4.50 ng/mL Final   2023 0.42 0.00 - 4.50 ng/mL Final   2023 0.37 0.00 - 4.50 ng/mL Final     Testosterone Level:   Testosterone Total   Date Value Ref Range Status   2024 291 240 - 950 ng/dL Final   2023 249 240 - 950 ng/dL Final       On-Study AUA Symptom Score (PQ)  AUA (American Urological Association) Symptom Score  1. Over the past month, how often have you had a sensation of not emptying your bladder completely after you finished urinating?: Less than half the time  2. Over the past month, how often have you had to urinate again less than two hours after you finished urinating?: Less than half the  time  3. Over the past month, how often have you found you stopped and started again several times when you urinated?: Less than 1 time in 5  4. Over the past month, how often have you found it difficult to postpone urination?: Less than half the time  5. Over the past month, how often have you had a weak urine stream?: Not at all  6. Over the past month, how often have you had to push or strain to begin urinating?: Not at all  7. Over the past month, how many times did you typically get up to urinate from the time you went to bed at night until the time you got up in the morning?: 1 time  The Disease-specific Quality of Life Question: If you were to spend the rest of your life with your urinary condition just the way it is now, how would you feel about that? (highlight or underline): Mixed  AUA Score: 8    Diarrhea: 0- None    Constipation: 0- None    Other Appointments:     MD Name:  Appointment Date:    MD Name: Appointment Date:   MD Name: Appointment Date:   Other Appointment Notes:     Residual Radiation side effect: Fatigue                  Again, thank you for allowing me to participate in the care of your patient.        Sincerely,        Joseph Lockhart MD

## 2024-08-03 ENCOUNTER — OFFICE VISIT (OUTPATIENT)
Dept: URGENT CARE | Facility: URGENT CARE | Age: 64
End: 2024-08-03
Payer: COMMERCIAL

## 2024-08-03 VITALS
HEART RATE: 68 BPM | SYSTOLIC BLOOD PRESSURE: 137 MMHG | DIASTOLIC BLOOD PRESSURE: 80 MMHG | BODY MASS INDEX: 27.6 KG/M2 | TEMPERATURE: 97 F | WEIGHT: 215 LBS | OXYGEN SATURATION: 98 %

## 2024-08-03 DIAGNOSIS — T24.201A PARTIAL THICKNESS BURN OF RIGHT LOWER EXTREMITY, INITIAL ENCOUNTER: Primary | ICD-10-CM

## 2024-08-03 PROCEDURE — 99213 OFFICE O/P EST LOW 20 MIN: CPT | Performed by: FAMILY MEDICINE

## 2024-08-03 RX ORDER — CEPHALEXIN 500 MG/1
500 CAPSULE ORAL 3 TIMES DAILY
Qty: 21 CAPSULE | Refills: 0 | Status: SHIPPED | OUTPATIENT
Start: 2024-08-03 | End: 2024-08-10

## 2024-08-03 RX ORDER — SILVER SULFADIAZINE 10 MG/G
CREAM TOPICAL DAILY
Qty: 50 G | Refills: 0 | Status: SHIPPED | OUTPATIENT
Start: 2024-08-03

## 2024-08-03 ASSESSMENT — PAIN SCALES - GENERAL: PAINLEVEL: MILD PAIN (3)
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
## 2024-09-19 ENCOUNTER — OFFICE VISIT (OUTPATIENT)
Dept: RADIATION ONCOLOGY | Facility: CLINIC | Age: 64
End: 2024-09-19
Payer: COMMERCIAL

## 2024-09-19 DIAGNOSIS — C61 PROSTATE CANCER (H): Primary | ICD-10-CM

## 2024-09-19 NOTE — LETTER
9/19/2024      Dakota Myers  [address] [facility] [address]      Dear Colleague,    Thank you for referring your patient, Dakota Myers, to the [facility] RADIATION ONCOLOGY. Please see a copy of my visit note below.    I saw the patient in the radiation oncology clinic to address some of the patient's concerns.    They are some persistent as well as symptoms that are improving.  The patient feels that the hot flashes are essentially resolved and the energy/fatigue is getting  better, slowly.  In addition, the patient noted some return of erectile function.    He complains of fatigue and incontinence of urine.  He requires approximately 2 pads per day which is described as a liner for the underwear.  He also noted tenderness/sensitivity of the left breast more than the right breast.    On exam today, the visual inspection shows breasts that were symmetric in appearance without gross gynecomastia.  The manual palpation of the bilateral central breast and the nipple areolar complex revealed no nodules/ mass.    I reviewed the recent total testosterone that has been increasing and I expect the recovery to continue.    I discussed the potential  lingering effects of the ADT and I believe it will improve over time.  However, I have asked the patient to call me in a month to report on the symptoms.    MINAL Lockhart M.D.  Department of Radiation Oncology  [facility]         Again, thank you for allowing me to participate in the care of your patient.        Sincerely,        Joseph Lockhart MD

## 2024-09-19 NOTE — PROGRESS NOTES
I saw the patient in the radiation oncology clinic to address some of the patient's concerns.    They are some persistent as well as symptoms that are improving.  The patient feels that the hot flashes are essentially resolved and the energy/fatigue is getting  better, slowly.  In addition, the patient noted some return of erectile function.    He complains of fatigue and incontinence of urine.  He requires approximately 2 pads per day which is described as a liner for the underwear.  He also noted tenderness/sensitivity of the left breast more than the right breast.    On exam today, the visual inspection shows breasts that were symmetric in appearance without gross gynecomastia.  The manual palpation of the bilateral central breast and the nipple areolar complex revealed no nodules/ mass.    I reviewed the recent total testosterone that has been increasing and I expect the recovery to continue.    I discussed the potential  lingering effects of the ADT and I believe it will improve over time.  However, I have asked the patient to call me in a month to report on the symptoms.    MINAL Lockhart M.D.  Department of Radiation Oncology  [facility]

## 2024-11-15 ENCOUNTER — LAB (OUTPATIENT)
Dept: LAB | Facility: CLINIC | Age: 64
End: 2024-11-15
Payer: COMMERCIAL

## 2024-11-15 DIAGNOSIS — C61 PROSTATE CANCER (H): ICD-10-CM

## 2024-11-15 LAB — PSA SERPL DL<=0.01 NG/ML-MCNC: <0.01 NG/ML (ref 0–4.5)

## 2024-11-15 PROCEDURE — 36415 COLL VENOUS BLD VENIPUNCTURE: CPT | Performed by: PATHOLOGY

## 2024-11-15 PROCEDURE — 84153 ASSAY OF PSA TOTAL: CPT | Performed by: PATHOLOGY

## 2025-01-15 DIAGNOSIS — C61 PROSTATE CANCER (H): ICD-10-CM

## 2025-01-15 RX ORDER — TADALAFIL 20 MG/1
20 TABLET ORAL DAILY PRN
Qty: 6 TABLET | Refills: 11 | Status: SHIPPED | OUTPATIENT
Start: 2025-01-15

## 2025-02-11 ENCOUNTER — TELEPHONE (OUTPATIENT)
Dept: FAMILY MEDICINE | Facility: CLINIC | Age: 65
End: 2025-02-11
Payer: COMMERCIAL

## 2025-02-11 NOTE — TELEPHONE ENCOUNTER
Reason for Call:  Appointment Request    Patient requesting this type of appt:  patient is requesting an in person visit with     Requested provider: Jagruti Ahumada    Reason patient unable to be scheduled: Not within requested timeframe    When does patient want to be seen/preferred time: within a month, if possible    Comments: states he has a lump in his left shoulder, causing pain, if he rolls on it. Would also like to see  for some referral requests, as he has a long history with this provider.    Could we send this information to you in [facility] or would you prefer to receive a phone call?:   No preference   Okay to leave a detailed message?: Yes at Cell number on file:    Telephone Information:   Mobile [phone]       Call taken on 2/11/2025 at 10:17 AM by Katerina Verma   "Contacted pt to schedule new pt appt with Dr. Wynn. Scheduled pt for 2/15 at 3:40. Provided directions to cancer center and advised for pt to call with any questions.     Unable to obtain any further records from Dr. Fisher's office. Also unable to obtain records from [facility] beds at the [facility] [place], as they state "no dates of treatment as requested."  Pt states she has an imaging disc that she will bring with her to appt.   "

## 2025-02-20 ENCOUNTER — MYC MEDICAL ADVICE (OUTPATIENT)
Dept: FAMILY MEDICINE | Facility: CLINIC | Age: 65
End: 2025-02-20

## 2025-02-20 ENCOUNTER — OFFICE VISIT (OUTPATIENT)
Dept: FAMILY MEDICINE | Facility: CLINIC | Age: 65
End: 2025-02-20
Payer: COMMERCIAL

## 2025-02-20 VITALS
RESPIRATION RATE: 16 BRPM | HEIGHT: 74 IN | WEIGHT: 215 LBS | DIASTOLIC BLOOD PRESSURE: 97 MMHG | BODY MASS INDEX: 27.59 KG/M2 | TEMPERATURE: 97.6 F | HEART RATE: 71 BPM | OXYGEN SATURATION: 100 % | SYSTOLIC BLOOD PRESSURE: 152 MMHG

## 2025-02-20 DIAGNOSIS — L72.3 SEBACEOUS CYST: ICD-10-CM

## 2025-02-20 DIAGNOSIS — Z00.00 ROUTINE GENERAL MEDICAL EXAMINATION AT A HEALTH CARE FACILITY: Primary | ICD-10-CM

## 2025-02-20 DIAGNOSIS — Z82.49 FAMILY HISTORY OF ISCHEMIC HEART DISEASE: ICD-10-CM

## 2025-02-20 DIAGNOSIS — Z15.09 HEREDITARY LEIOMYOMATOSIS AND RENAL CELL CANCER (HLRCC): ICD-10-CM

## 2025-02-20 DIAGNOSIS — C61 PROSTATE CANCER (H): ICD-10-CM

## 2025-02-20 DIAGNOSIS — Z15.09 HEREDITARY LEIOMYOMATOSIS AND RENAL CELL CANCER (HLRCC): Primary | ICD-10-CM

## 2025-02-20 DIAGNOSIS — R03.0 ELEVATED BP WITHOUT DIAGNOSIS OF HYPERTENSION: ICD-10-CM

## 2025-02-20 PROBLEM — Z84.89 FAMILY HISTORY OF GENETIC DISEASE: Status: RESOLVED | Noted: 2022-12-29 | Resolved: 2025-02-20

## 2025-02-20 LAB
ANION GAP SERPL CALCULATED.3IONS-SCNC: 15 MMOL/L (ref 7–15)
BUN SERPL-MCNC: 12.4 MG/DL (ref 8–23)
CALCIUM SERPL-MCNC: 9.7 MG/DL (ref 8.8–10.4)
CHLORIDE SERPL-SCNC: 101 MMOL/L (ref 98–107)
CHOLEST SERPL-MCNC: 272 MG/DL
CREAT SERPL-MCNC: 1.09 MG/DL (ref 0.67–1.17)
EGFRCR SERPLBLD CKD-EPI 2021: 76 ML/MIN/1.73M2
FASTING STATUS PATIENT QL REPORTED: NO
FASTING STATUS PATIENT QL REPORTED: NO
GLUCOSE SERPL-MCNC: 98 MG/DL (ref 70–99)
HCO3 SERPL-SCNC: 22 MMOL/L (ref 22–29)
HDLC SERPL-MCNC: 41 MG/DL
LDLC SERPL CALC-MCNC: 177 MG/DL
NONHDLC SERPL-MCNC: 231 MG/DL
POTASSIUM SERPL-SCNC: 4.7 MMOL/L (ref 3.4–5.3)
SODIUM SERPL-SCNC: 138 MMOL/L (ref 135–145)
TRIGL SERPL-MCNC: 271 MG/DL

## 2025-02-20 SDOH — HEALTH STABILITY: PHYSICAL HEALTH: ON AVERAGE, HOW MANY DAYS PER WEEK DO YOU ENGAGE IN MODERATE TO STRENUOUS EXERCISE (LIKE A BRISK WALK)?: 3 DAYS

## 2025-02-20 SDOH — HEALTH STABILITY: PHYSICAL HEALTH: ON AVERAGE, HOW MANY MINUTES DO YOU ENGAGE IN EXERCISE AT THIS LEVEL?: 30 MIN

## 2025-02-20 ASSESSMENT — ANXIETY QUESTIONNAIRES
3. WORRYING TOO MUCH ABOUT DIFFERENT THINGS: NOT AT ALL
5. BEING SO RESTLESS THAT IT IS HARD TO SIT STILL: NOT AT ALL
8. IF YOU CHECKED OFF ANY PROBLEMS, HOW DIFFICULT HAVE THESE MADE IT FOR YOU TO DO YOUR WORK, TAKE CARE OF THINGS AT HOME, OR GET ALONG WITH OTHER PEOPLE?: NOT DIFFICULT AT ALL
GAD7 TOTAL SCORE: 0
2. NOT BEING ABLE TO STOP OR CONTROL WORRYING: NOT AT ALL
6. BECOMING EASILY ANNOYED OR IRRITABLE: NOT AT ALL
7. FEELING AFRAID AS IF SOMETHING AWFUL MIGHT HAPPEN: NOT AT ALL
GAD7 TOTAL SCORE: 0
4. TROUBLE RELAXING: NOT AT ALL
GAD7 TOTAL SCORE: 0
IF YOU CHECKED OFF ANY PROBLEMS ON THIS QUESTIONNAIRE, HOW DIFFICULT HAVE THESE PROBLEMS MADE IT FOR YOU TO DO YOUR WORK, TAKE CARE OF THINGS AT HOME, OR GET ALONG WITH OTHER PEOPLE: NOT DIFFICULT AT ALL
7. FEELING AFRAID AS IF SOMETHING AWFUL MIGHT HAPPEN: NOT AT ALL
1. FEELING NERVOUS, ANXIOUS, OR ON EDGE: NOT AT ALL

## 2025-02-20 ASSESSMENT — PAIN SCALES - GENERAL: PAINLEVEL_OUTOF10: NO PAIN (0)

## 2025-02-20 ASSESSMENT — SOCIAL DETERMINANTS OF HEALTH (SDOH): HOW OFTEN DO YOU GET TOGETHER WITH FRIENDS OR RELATIVES?: ONCE A WEEK

## 2025-02-20 NOTE — NURSING NOTE
Prior to immunization administration, verified patients identity using patient s name and date of birth. Please see Immunization Activity for additional information.     Screening Questionnaire for Adult Immunization    Are you sick today?   No   Do you have allergies to medications, food, a vaccine component or latex?   No   Have you ever had a serious reaction after receiving a vaccination?   No   Do you have a long-term health problem with heart, lung, kidney, or metabolic disease (e.g., diabetes), asthma, a blood disorder, no spleen, complement component deficiency, a cochlear implant, or a spinal fluid leak?  Are you on long-term aspirin therapy?   No   Do you have cancer, leukemia, HIV/AIDS, or any other immune system problem?   No   Do you have a parent, brother, or sister with an immune system problem?   No   In the past 3 months, have you taken medications that affect  your immune system, such as prednisone, other steroids, or anticancer drugs; drugs for the treatment of rheumatoid arthritis, Crohn s disease, or psoriasis; or have you had radiation treatments?   No   Have you had a seizure, or a brain or other nervous system problem?   No   During the past year, have you received a transfusion of blood or blood    products, or been given immune (gamma) globulin or antiviral drug?   No   For women: Are you pregnant or is there a chance you could become       pregnant during the next month?   No   Have you received any vaccinations in the past 4 weeks?   No     Immunization questionnaire answers were all negative.        Patient instructed to remain in clinic for 15 minutes afterwards, and to report any adverse reactions.     Screening performed by Emilie Mustafa MA on 2/20/2025 at 11:53 AM.

## 2025-02-20 NOTE — PROGRESS NOTES
Preventive Care Visit  [facility]  Jagruti Ahumada MD, Family Medicine  Feb 20, 2025      Assessment & Plan     Problem List as of 2/20/2025 Reviewed: 2/20/2025 12:41 PM by Jagruti Ahumada MD            Noted       High    1. Routine general medical examination at a health care facility - Primary 2/20/2025     Overview Signed 2/20/2025 12:48 PM by Jagruti Ahumada MD      02/20/2025 A/P:    64 year old very well known to me (since residency - 2006?) here today for check up.  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  This year: doing well, lifting weights almost every day, weekends [place] his [place].    Preventative Plan:  Labs: lipids, bmp  PSA: prostate cancer- following with oncology   PSA Tumor Marker   Date Value Ref Range Status   11/15/2024 <0.01 0.00 - 4.50 ng/mL Final     Colon: up to date, due 2028   Immunizations: pcv 20 and Shingrix today    Healthy lifestyle and healthy aging recommendations reinforced including the importance of:  regular exercise & weight lifting  adequate and regular sleep, at least 7 hrs nightly   5+ fruits and veggies daily, whole grains, limit processed food  social connections          Relevant Orders     BASIC METABOLIC PANEL     CT Coronary Calcium Scan     Lipid panel reflex to direct LDL Non-fasting       Medium    2. Elevated BP without diagnosis of hypertension 2/20/2025     Overview Signed 2/20/2025 12:43 PM by Jagruti Ahumada MD      02/20/2025 A/P:  High today, but PTSD with doctor's office visits  Still, doesn't check at home  Will check at home and MyChart message results with me in 10 days.  If high, start medication.         3. Family history of ischemic heart disease 2/20/2025     Overview Signed 2/20/2025 12:42 PM by Jagruti Ahumada MD      02/20/2025 A/P:   Strong family history in mom and mom's side of family  Get CT Ca test  If  "shows calcifications - start ASA and statin, possibly refer to cardiology         4. Hereditary leiomyomatosis and renal cell cancer (HLRCC) 2/20/2025     Overview Signed 2/20/2025 12:32 PM by Jagruti Ahumada MD      02/20/2025 A/P:  In [facility] study  From my review - should be having MRI of kidneys every 6-12 months.  I'll message him to see if [facility] is doing this or if we should.          Relevant Orders     Adult Dermatology  Referral    5. Prostate cancer (H) 5/9/2023     Overview Signed 2/20/2025 12:40 PM by Jagruti Ahumada MD      02/20/2025 A/P:  Treated surgically and with miriam Lockhart.  Radiation completed 9/2023.    Following with oncology and PSA every 6 months.  Last visit 11/2024 reviewed.            SKIN: Left shoulder blade: draining sebaceous cyst does not look infected left shoulder.  I placed pressure and drained more pus and core cyst content.  No surrounding erythema.  No swelling      BMI  Estimated body mass index is 27.6 kg/m  as calculated from the following:    Height as of this encounter: 1.88 m (6' 2\").    Weight as of this encounter: 97.5 kg (215 lb).   Weight management plan: Discussed healthy diet and exercise guidelines    Counseling  Appropriate preventive services were addressed with this patient via screening, questionnaire, or discussion as appropriate for fall prevention, nutrition, physical activity, Tobacco-use cessation, social engagement, weight loss and cognition.  Checklist reviewing preventive services available has been given to the patient.  Reviewed patient's diet, addressing concerns and/or questions.   He is at risk for lack of exercise and has been provided with information to increase physical activity for the benefit of his well-being.         Rafiq Duncan is a 64 year old, presenting for the following:  Physical, Mass, and Referral        2/20/2025    10:38 AM   Additional Questions   Roomed by Joseline Sultana          History of Present Illness "       Reason for visit:  Tumor/growth under skin that has erupted in infection(?)  Symptom onset:  3-4 weeks ago  Symptoms include:  Pain, discharge  Symptom intensity:  Moderate  Symptom progression:  Worsening  Had these symptoms before:  No  What makes it worse:  No  What makes it better:  No   He is taking medications regularly.      Wants a referral to a new PCP he can see more easily.    Family history of heart attack in mom, sibs.      Also - had a lump on back left shoulder.  Has had it for 2-3 years.  So many other things going on with prostate cancer so didn't follow up on it.  Thought it was lipomar.  But then 2 weeks ago, noticed that it was starting to hurt rolling over on side.  Golf ball size.  Red are aaround it.  Then over the weekend it broke open, now hot compresses.    She squeezed it and disgusting.      Health Care Directive  Patient does not have a Health Care Directive: Discussed advance care planning with patient; information given to patient to review.      2/20/2025   General Health   How would you rate your overall physical health? Good   Feel stress (tense, anxious, or unable to sleep) Not at all         2/20/2025   Nutrition   Three or more servings of calcium each day? Yes   Diet: Vegetarian/vegan   How many servings of fruit and vegetables per day? 4 or more   How many sweetened beverages each day? (!) 2         2/20/2025   Exercise   Days per week of moderate/strenous exercise 3 days   Average minutes spent exercising at this level 30 min         2/20/2025   Social Factors   Frequency of gathering with friends or relatives Once a week   Worry food won't last until get money to buy more No   Food not last or not have enough money for food? No   Do you have housing? (Housing is defined as stable permanent housing and does not include staying ouside in a car, in a tent, in an abandoned building, in an overnight shelter, or couch-surfing.) Yes   Are you worried about losing your housing?  No   Lack of transportation? No   Unable to get utilities (heat,electricity)? No         2025   Fall Risk   Fallen 2 or more times in the past year? No    Trouble with walking or balance? No        Proxy-reported          2025   Dental   Dentist two times every year? Yes            Today's PHQ-2 Score:       2025    11:34 AM   PHQ-2 (  [employer])   Q1: Little interest or pleasure in doing things 0   Q2: Feeling down, depressed or hopeless 0   PHQ-2 Score 0    Q1: Little interest or pleasure in doing things Not at all   Q2: Feeling down, depressed or hopeless Not at all   PHQ-2 Score 0       Patient-reported           2025   Substance Use   Alcohol more than 3/day or more than 7/wk Not Applicable   Do you use any other substances recreationally? (!) CANNABIS PRODUCTS     Social History     Tobacco Use    Smoking status: Former     Current packs/day: 0.00     Average packs/day: 1 pack/day for 15.0 years (15.0 ttl pk-yrs)     Types: Cigarettes     Start date: 10/8/1995     Quit date: 10/8/2010     Years since quittin.3     Passive exposure: Past    Smokeless tobacco: Never   Vaping Use    Vaping status: Every Day    Substances: Nicotine    Devices: Refillable tank   Substance Use Topics    Alcohol use: Not Currently     Comment: Sober     Drug use: No             2025   One time HIV Screening   Previous HIV test? Yes         2025   STI Screening   New sexual partner(s) since last STI/HIV test? No   Last PSA:   PSA Tumor Marker   Date Value Ref Range Status   11/15/2024 <0.01 0.00 - 4.50 ng/mL Final     ASCVD Risk   The 10-year ASCVD risk score (Lisa JOSEPH, et al., 2019) is: 20.7%    Values used to calculate the score:      Age: 64 years      Sex: Male      Is Non- : No      Diabetic: No      Tobacco smoker: No      Systolic Blood Pressure: 152 mmHg      Is BP treated: No      HDL Cholesterol: 40 mg/dL      Total Cholesterol: 248 mg/dL          "  Reviewed and updated as needed this visit by Provider   Tobacco  Allergies  Meds  Problems                    Objective    Exam  BP (!) 152/97 (BP Location: Right arm, Patient Position: Sitting, Cuff Size: Adult Regular)   Pulse 71   Temp 97.6  F (36.4  C) (Esophageal)   Resp 16   Ht 1.88 m (6' 2\")   Wt 97.5 kg (215 lb)   SpO2 100%   BMI 27.60 kg/m     Estimated body mass index is 27.6 kg/m  as calculated from the following:    Height as of this encounter: 1.88 m (6' 2\").    Weight as of this encounter: 97.5 kg (215 lb).    Physical Exam  GENERAL: alert and no distress  EYES: Eyes grossly normal to inspection, PERRL and conjunctivae and sclerae normal  HENT: ear canals and TM's normal, nose and mouth without ulcers or lesions  NECK: no adenopathy, no asymmetry, masses, or scars  RESP: lungs clear to auscultation - no rales, rhonchi or wheezes  CV: regular rate and rhythm, normal S1 S2, no S3 or S4, no murmur, click or rub, no peripheral edema  MS: no gross musculoskeletal defects noted, no edema  NEURO: Normal strength and tone, mentation intact and speech normal  PSYCH: mentation appears normal, affect normal/bright  SKIN: Left shoulder blade: draining sebaceous cyst does not look infected left shoulder.  I placed pressure and drained more pus and core cyst content.  No surrounding erythema.  No swelling        Signed Electronically by: Jagruti Ahumada MD    "

## 2025-02-20 NOTE — PATIENT INSTRUCTIONS
Dr. Keesha Sierra    For your blood pressure:    Check your blood pressure - goal is 6 times in next 1-2 weeks.  It's important that you take your blood pressure correctly:    The best time is first thing in the morning, after you've gone to the bathroom and before you've eaten, and after sitting quietly 5 minutes (no talking, drinking coffee, etc).  2nd best time is before dinner - also after sitting quietly for 5-10 minutes  Note your blood pressure on a paper log or on your phone  Best diet for blood pressure is low sodium, high potassium (DASH) diet, limit or no alcohol.    I will send you a [employer] message in ~2 weeks to follow up on your results.  If your results average to <140/90 - great!  No further action needed.  Keep an eye on your blood pressure once a month.  If your results average to >140/90 then schedule a virtual visit with me or start an eVisit with me to adjust medications.     Patient Education   Preventive Care Advice   This is general advice given by our system to help you stay healthy. However, your care team may have specific advice just for you. Please talk to your care team about your preventive care needs.  Nutrition  Eat 5 or more servings of fruits and vegetables each day.  Try wheat bread, brown rice and whole grain pasta (instead of white bread, rice, and pasta).  Get enough calcium and vitamin D. Check the label on foods and aim for 100% of the RDA (recommended daily allowance).  Lifestyle  Exercise at least 150 minutes each week  (30 minutes a day, 5 days a week).  Do muscle strengthening activities 2 days a week. These help control your weight and prevent disease.  No smoking.  Wear sunscreen to prevent skin cancer.  Have a dental exam and cleaning every 6 months.  Yearly exams  See your health care team every year to talk about:  Any changes in your health.  Any medicines your care team has prescribed.  Preventive care, family planning, and ways to prevent chronic  diseases.  Shots (vaccines)   HPV shots (up to age 26), if you've never had them before.  Hepatitis B shots (up to age 59), if you've never had them before.  COVID-19 shot: Get this shot when it's due.  Flu shot: Get a flu shot every year.  Tetanus shot: Get a tetanus shot every 10 years.  Pneumococcal, hepatitis A, and RSV shots: Ask your care team if you need these based on your risk.  Shingles shot (for age 50 and up)  General health tests  Diabetes screening:  Starting at age 35, Get screened for diabetes at least every 3 years.  If you are younger than age 35, ask your care team if you should be screened for diabetes.  Cholesterol test: At age 39, start having a cholesterol test every 5 years, or more often if advised.  Bone density scan (DEXA): At age 50, ask your care team if you should have this scan for osteoporosis (brittle bones).  Hepatitis C: Get tested at least once in your life.  STIs (sexually transmitted infections)  Before age 24: Ask your care team if you should be screened for STIs.  After age 24: Get screened for STIs if you're at risk. You are at risk for STIs (including HIV) if:  You are sexually active with more than one person.  You don't use condoms every time.  You or a partner was diagnosed with a sexually transmitted infection.  If you are at risk for HIV, ask about PrEP medicine to prevent HIV.  Get tested for HIV at least once in your life, whether you are at risk for HIV or not.  Cancer screening tests  Cervical cancer screening: If you have a cervix, begin getting regular cervical cancer screening tests starting at age 21.  Breast cancer scan (mammogram): If you've ever had breasts, begin having regular mammograms starting at age 40. This is a scan to check for breast cancer.  Colon cancer screening: It is important to start screening for colon cancer at age 45.  Have a colonoscopy test every 10 years (or more often if you're at risk) Or, ask your provider about stool tests like a  FIT test every year or Cologuard test every 3 years.  To learn more about your testing options, visit:   .  For help making a decision, visit:   https://bit.ly/pn93297.  Prostate cancer screening test: If you have a prostate, ask your care team if a prostate cancer screening test (PSA) at age 55 is right for you.  Lung cancer screening: If you are a current or former smoker ages 50 to 80, ask your care team if ongoing lung cancer screenings are right for you.  For informational purposes only. Not to replace the advice of your health care provider. Copyright   2023 [employer]. All rights reserved. Clinically reviewed by the [facility] Transitions [facility]. [employer] 830503 - REV 01/24.  Substance Use Disorder: Care Instructions  Overview     You can improve your life and health by stopping your use of alcohol or drugs. When you don't drink or use drugs, you may feel and sleep better. You may get along better with your family, friends, and coworkers. There are medicines and programs that can help with substance use disorder.  How can you care for yourself at home?  Here are some ways to help you stay sober and prevent relapse.  If you have been given medicine to help keep you sober or reduce your cravings, be sure to take it exactly as prescribed.  Talk to your doctor about programs that can help you stop using drugs or drinking alcohol.  Do not keep alcohol or drugs in your home.  Plan ahead. Think about what you'll say if other people ask you to drink or use drugs. Try not to spend time with people who drink or use drugs.  Use the time and money spent on drinking or drugs to do something that's important to you.  Preventing a relapse  Have a plan to deal with relapse. Learn to recognize changes in your thinking that lead you to drink or use drugs. Get help before you start to drink or use drugs again.  Try to stay away from situations, friends, or places that may lead you to drink or use  drugs.  If you feel the need to drink alcohol or use drugs again, seek help right away. Call a trusted friend or family member. Some people get support from organizations such as Narcotics Anonymous or [employer] or from treatment facilities.  If you relapse, get help as soon as you can. Some people make a plan with another person that outlines what they want that person to do for them if they relapse. The plan usually includes how to handle the relapse and who to notify in case of relapse.  Don't give up. Remember that a relapse doesn't mean that you have failed. Use the experience to learn the triggers that lead you to drink or use drugs. Then quit again. Recovery is a lifelong process. Many people have several relapses before they are able to quit for good.  Follow-up care is a key part of your treatment and safety. Be sure to make and go to all appointments, and call your doctor if you are having problems. It's also a good idea to know your test results and keep a list of the medicines you take.  When should you call for help?   Call 911  anytime you think you may need emergency care. For example, call if you or someone else:    Has overdosed or has withdrawal signs. Be sure to tell the emergency workers that you are or someone else is using or trying to quit using drugs. Overdose or withdrawal signs may include:  Losing consciousness.  Seizure.  Seeing or hearing things that aren't there (hallucinations).     Is thinking or talking about suicide or harming others.   Where to get help 24 hours a day, 7 days a week   If you or someone you know talks about suicide, self-harm, a mental health crisis, a substance use crisis, or any other kind of emotional distress, get help right away. You can:    Call the Suicide and Crisis Lifeline at 988.     Call 5-466-579-TALK (1-525.761.4027).     Text HOME to 703093 to access the Crisis Text Line.   Consider saving these numbers in your phone.  Go to [employer]org for  "more information or to chat online.  Call your doctor now or seek immediate medical care if:    You are having withdrawal symptoms. These may include nausea or vomiting, sweating, shakiness, and anxiety.   Watch closely for changes in your health, and be sure to contact your doctor if:    You have a relapse.     You need more help or support to stop.   Where can you learn more?  Go to https://www.[employer].net/patiented  Enter H573 in the search box to learn more about \"Substance Use Disorder: Care Instructions.\"  Current as of: November 15, 2023  Content Version: 14.3    2024 [employer].   Care instructions adapted under license by your healthcare professional. If you have questions about a medical condition or this instruction, always ask your healthcare professional. [employer] disclaims any warranty or liability for your use of this information.       "

## 2025-02-26 PROBLEM — E78.2 MIXED HYPERLIPIDEMIA: Status: ACTIVE | Noted: 2025-02-26

## 2025-03-17 ENCOUNTER — OFFICE VISIT (OUTPATIENT)
Dept: DERMATOLOGY | Facility: CLINIC | Age: 65
End: 2025-03-17
Payer: COMMERCIAL

## 2025-03-17 DIAGNOSIS — R22.9 SUBCUTANEOUS NODULE: Primary | ICD-10-CM

## 2025-03-17 ASSESSMENT — PAIN SCALES - GENERAL: PAINLEVEL_OUTOF10: NO PAIN (0)
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
## 2025-03-17 NOTE — NURSING NOTE
Chief Complaint   Patient presents with    Derm Problem     Cyst-L shoulder, reports he has had it for about 3 years. Drains on and off and is painful.      Sonya FONTAINE RN  Dermatology Surgery
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
## 2025-03-18 ENCOUNTER — TELEPHONE (OUTPATIENT)
Dept: DERMATOLOGY | Facility: CLINIC | Age: 65
End: 2025-03-18
Payer: COMMERCIAL

## 2025-03-18 NOTE — TELEPHONE ENCOUNTER
Called patient to schedule surgery with Dr. Neville    Date of Surgery: 06/18    Surgery type: excision    Consult scheduled: No    Has patient had mohs with us before? Not Applicable    Additional comments: daniel Keller on 3/18/2025 at 9:56 AM

## 2025-03-18 NOTE — TELEPHONE ENCOUNTER
Left patient a voicemail to schedule an excision for   3 cm Cyst-L shoulder       with Dr. Neville. Provided my direct phone number.    Jena Keller on 3/18/2025 at 9:48 AM

## 2025-03-31 NOTE — PROVIDER NOTIFICATION
Patient confirmed scheduled appointment:  Date: 10/8/25  Time: 1:00PM  Visit type: RETURN NEPH  Provider: ELFERING  [place] [facility]  Testing/imaging: N/A  Additional notes: N/A    Time: 1000  Provider notified: GEN CAREY    Creatinine sent to lab. BRI ANAMIKA is leaking large amounts of bloody drainage around ANAMIKA site, dressing saturated in less than 1 hour.    Response: No response.

## 2025-05-15 ENCOUNTER — HOSPITAL ENCOUNTER (OUTPATIENT)
Dept: CT IMAGING | Facility: CLINIC | Age: 65
Discharge: HOME | End: 2025-05-15
Attending: FAMILY MEDICINE
Payer: MEDICARE

## 2025-05-15 DIAGNOSIS — Z00.00 ROUTINE GENERAL MEDICAL EXAMINATION AT A HEALTH CARE FACILITY: ICD-10-CM

## 2025-05-15 PROCEDURE — 75571 CT HRT W/O DYE W/CA TEST: CPT

## 2025-05-16 ENCOUNTER — RESULTS FOLLOW-UP (OUTPATIENT)
Dept: FAMILY MEDICINE | Facility: CLINIC | Age: 65
End: 2025-05-16

## 2025-05-17 NOTE — RESULT ENCOUNTER NOTE
Hi again Dakota:  Wow - you have no coronary calcifications in your arteries.  This is good news.  Let's just keep an eye on your cholesterol and recheck next year.  Let my team know if you have any questions.  Best,  Dr. Jagruti Ahumada MD/[facility]   '

## 2025-05-17 NOTE — RESULT ENCOUNTER NOTE
"Nato Duncan:  You'll get 2 notes from me about these results.  This one is for the \"non-cardiac\" results.  Your test showed some small pulmonary nodules.  This is a very common finding and they are typically benign and nothing to worry about, but because of your smoking history I do want to follow up on these with a repeat CT scan in 1 year to be sure they haven't grown.  Let my team know if you have any questions.  Best,  Dr. Jagruti Ahumada MD/[facility]        "

## 2025-05-20 NOTE — TELEPHONE ENCOUNTER
Dr. Ahumada - see MyChart additional patient update and concern. Appreciate advisement.    TC team - okay to schedule into any virtual slot with a clinician this week upon callback.    Writer replied to patient via [employer] - virtual visit advised.    GIA Mauricio, BSN, PHN, AMB-BC (she/her)  [facility] RN

## 2025-05-21 ASSESSMENT — ANXIETY QUESTIONNAIRES
GAD7 TOTAL SCORE: 16
7. FEELING AFRAID AS IF SOMETHING AWFUL MIGHT HAPPEN: NOT AT ALL
6. BECOMING EASILY ANNOYED OR IRRITABLE: MORE THAN HALF THE DAYS
5. BEING SO RESTLESS THAT IT IS HARD TO SIT STILL: NEARLY EVERY DAY
IF YOU CHECKED OFF ANY PROBLEMS ON THIS QUESTIONNAIRE, HOW DIFFICULT HAVE THESE PROBLEMS MADE IT FOR YOU TO DO YOUR WORK, TAKE CARE OF THINGS AT HOME, OR GET ALONG WITH OTHER PEOPLE: VERY DIFFICULT
4. TROUBLE RELAXING: NEARLY EVERY DAY
GAD7 TOTAL SCORE: 16
2. NOT BEING ABLE TO STOP OR CONTROL WORRYING: NEARLY EVERY DAY
1. FEELING NERVOUS, ANXIOUS, OR ON EDGE: NEARLY EVERY DAY
GAD7 TOTAL SCORE: 16
3. WORRYING TOO MUCH ABOUT DIFFERENT THINGS: MORE THAN HALF THE DAYS
7. FEELING AFRAID AS IF SOMETHING AWFUL MIGHT HAPPEN: NOT AT ALL
8. IF YOU CHECKED OFF ANY PROBLEMS, HOW DIFFICULT HAVE THESE MADE IT FOR YOU TO DO YOUR WORK, TAKE CARE OF THINGS AT HOME, OR GET ALONG WITH OTHER PEOPLE?: VERY DIFFICULT

## 2025-05-21 NOTE — TELEPHONE ENCOUNTER
Bed: 01  Expected date:   Expected time:   Means of arrival:   Comments:  LIFELINE   Looks like scheduled with Dr. Hutchison tomorrow who should be able to help.    Dr. Hutchison - I imagine from knowing Dakota for some time and this situation that he most likely will be asking for something like short term lorazepam or similar for acute anxiety.  I know you don't typically prescribe this - so feel free to loop me in if you think it's appropriate and need help prescribing a short term supply.

## 2025-05-21 NOTE — TELEPHONE ENCOUNTER
Happy to see him! I can definitely discuss benzo with him and am comfortable prescribing if you have no concerns with it. Mercedez Hutchison M.D.

## 2025-05-22 ENCOUNTER — VIRTUAL VISIT (OUTPATIENT)
Dept: FAMILY MEDICINE | Facility: CLINIC | Age: 65
End: 2025-05-22
Payer: MEDICARE

## 2025-05-22 ENCOUNTER — LAB (OUTPATIENT)
Dept: LAB | Facility: CLINIC | Age: 65
End: 2025-05-22
Payer: MEDICARE

## 2025-05-22 DIAGNOSIS — C61 PROSTATE CANCER (H): ICD-10-CM

## 2025-05-22 DIAGNOSIS — R45.89 ANXIETY ABOUT HEALTH: Primary | ICD-10-CM

## 2025-05-22 DIAGNOSIS — F43.22 ACUTE ADJUSTMENT DISORDER WITH ANXIETY: ICD-10-CM

## 2025-05-22 LAB — PSA SERPL DL<=0.01 NG/ML-MCNC: <0.01 NG/ML (ref 0–4.5)

## 2025-05-22 RX ORDER — DIAZEPAM 2 MG/1
2 TABLET ORAL EVERY 12 HOURS PRN
Qty: 15 TABLET | Refills: 0 | Status: SHIPPED | OUTPATIENT
Start: 2025-05-22
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
## 2025-05-27 ENCOUNTER — VIRTUAL VISIT (OUTPATIENT)
Dept: RADIATION THERAPY | Facility: OUTPATIENT CENTER | Age: 65
End: 2025-05-27
Payer: COMMERCIAL

## 2025-05-27 DIAGNOSIS — C61 PROSTATE CANCER (H): Primary | ICD-10-CM
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
## 2025-06-05 ENCOUNTER — TELEPHONE (OUTPATIENT)
Dept: DERMATOLOGY | Facility: CLINIC | Age: 65
End: 2025-06-05
Payer: MEDICARE

## 2025-06-05 NOTE — TELEPHONE ENCOUNTER
Excision/Mohs previsit information                                                    Diagnosis: EIC vs Lipoma vs   Site(s): left posterior shoulder    Is the surgical site below the waist?  NO  If yes, instruct the patient to purchase over the counter chlorhexidine surgical soap and wash all skin below the belly button twice before surgery    No Known Allergies    Review and update allergy and medication list    Do you take the following medications:  Coumadin, Eliquis, Pradaxa, Xarelto:  NO.  If on Coumadin, INR should be checked within 7 days of surgery.  Range should be 3.5 or less or within therapeutic range.    Do you currently or have you previously had any of the following conditions:  Hepatitis:  NO  HIV/AIDS:  NO  Prolonged bleeding or bleeding disorder:  NO  Pacemaker: NO  Defibrillator:  NO  History of artificial or heart valve replacement:  NO  Endocarditis (inflammation of the inner lining of the heart's chambers and valves):  NO  Have you ever had a prosthetic joint infection:  NO  Pregnant or Breastfeeding:  N/A  Mobility device (wheelchair, transfer difficulty): NO    Important Reminders:                                                      -For Mohs, notify patient they may be here through the lunch hour.  Be prepared to have down time and we recommend they bring a book or something to do.  -Ok to take all of their medications as prescribed  -Notify patient to eat prior to appointment.  The medication is more likely to cause you to feel jittery if you have an empty stomach.  -If face is being treated, please come with a make-up free face  -Avoid wearing earrings and necklaces  -If scalp is being treated, please come with clean hair free from product  -Patient will not be able to get the site wet for 48 hrs  -No submerging wound in standing water (lake, pool, bathtub, hot tub) for 2 weeks  -No physical activity for 48 hrs (further restrictions will be discussed by MD at time of visit)    If any  positives, send to RN for further review  Kate Vieira RN

## 2025-06-05 NOTE — TELEPHONE ENCOUNTER
Called and talked with patient for previsit checklist. Patient is currently in the hospital getting tumor removed and will update us if he needs to reschedule.     Patient also wanted to let us know that he doesn't quite like the reaction of lido with Epi and would prefer lidocaine alone for numbing. Will add to appointment notes.       Kate Vieira RN on 6/5/2025 at 1:07 PM

## 2025-06-18 ENCOUNTER — OFFICE VISIT (OUTPATIENT)
Dept: DERMATOLOGY | Facility: CLINIC | Age: 65
End: 2025-06-18
Payer: MEDICARE

## 2025-06-18 VITALS — OXYGEN SATURATION: 96 % | DIASTOLIC BLOOD PRESSURE: 81 MMHG | HEART RATE: 73 BPM | SYSTOLIC BLOOD PRESSURE: 150 MMHG

## 2025-06-18 DIAGNOSIS — L72.0 EIC (EPIDERMAL INCLUSION CYST): Primary | ICD-10-CM

## 2025-06-18 NOTE — LETTER
6/18/2025      Dakota Myers  [address] [facility] [address]      Dear Colleague,    Thank you for referring your patient, Dakota Myers, to the [facility]. Please see a copy of my visit note below.    DERMATOLOGY EXCISION PROCEDURE NOTE    Dermatology Problem List:    1. History of hereditary leiomyomatosis  2. Subcutaneous nodule, L posterior shoulder, s/p excision 6/18/25  _______________________    NAME OF PROCEDURE: Excision with intermediate linear closure  Staff surgeon: Dr. Clint Neville MD   Fellow: Dr. Isabella Soliman  Scrub Nurse: Annita Lorenzana    PRE-OPERATIVE DIAGNOSIS:  subcutaneous nodule  POST-OPERATIVE DIAGNOSIS: Same   LOCATION: left posterior shoulder   FINAL EXCISION SIZE(DEFECT SIZE): 3.2 x 2.2 cm  MARGIN: 0 cm  FINAL REPAIR LENGTH: 7.8 cm   ANESTHESIA: 9 mL lidocaine, 3 mL 1% lidocaine with 1:100,000 epinephrine    INDICATIONS: This patient presented with a 3.2 x 2.2 cm subcutaneous nodule, favor epidermal inclusion cyst. Excision was indicated. We discussed the principles of treatment and most likely complications including scarring, bleeding, infection, incomplete excision, wound dehiscence, pain, nerve damage, and recurrence. Informed consent was obtained and the patient underwent the procedure as follows:    PROCEDURE: The patient was taken to the operative suite. Time-out was performed. The treatment area was anesthetized with 1% lidocaine with epinephrine. The area was prepped with Chlorhexidine and rinsed with sterile saline and draped with sterile towels. The lesion was delineated and excised down to subcutaneous fat in a elliptical manner. Hemostasis was obtained by electrocoagulation.     REPAIR: An intermediate layered linear closure was selected as the procedure which would maximally preserve both function and cosmesis.    After the excision of the tumor, the area was carefully undermined. Hemostasis was obtained with bipolar electrocoagulation.  Closure was oriented so that the wound was in the patient's natural skin tension lines. The deeper layers of subcutaneous and superficial (nonmuscle) fascia tissues were then approximated using 3-0 Vicryl buried verticle mattress sutures (deep layer suturing). The wound edges were then approximated; additional buried sutures were placed in a similar fashion where needed. 4-0 Monocryl sutures (superficial layer suturing) were carefully placed for maximum eversion and meticulous approximation. Dermabond was applied over the incision    Estimated blood loss was less than 10 ml for all surgical sites. A sterile pressure dressing was applied and wound care instructions, with a written handout, were given. The [facility] was [facility] alert and ambulatory.    The patient elected for pathology results to automatically release and understands that the clinical staff will contact them as soon as possible to notify them of the results.    Follow-up: PRN    Dr. Clint Neville, was physically present  for the entire procedure, key portions of the reconstruction and always immediately available.     Anatomic Pathology Results: pending    Clinical Follow-Up: N/A     Staff Involved:  Scribe/Staff    Scribe Disclosure:   IIsabella, am serving as a scribe; to document services personally performed by Dr. Clint Neville - based on data collection and the provider's statements to me.     Electronically signed by:   Isabella Soliman MD  Mohs Micrographic Surgery and Dermatologic Oncology Fellow  [facility]    Staff Physician Comments:   I saw and evaluated the patient with the Mohs Surgery Fellow (Dr. Isabella Soliman) and I agree with the assessment and plan and the above description of the procedure as documented by the scribe. I was present for the key portions of the procedure and entire exam. I was immediately available in the clinic throughout the procedures.       Clint Neville,      Department of Dermatology  [facility]: Phone: [phone], Fax:[phone]  [facility]: Phone: [phone], Fax: [phone]      Again, thank you for allowing me to participate in the care of your patient.        Sincerely,        Clint Neville MD    Electronically signed

## 2025-06-18 NOTE — PATIENT INSTRUCTIONS
Caring for your skin after surgery    For the first 48 hours after your surgery:  Leave the pressure dressing on and keep it dry. If it should come loose, you may re-tape it, but do not take it off.  Relax and take it easy.  Do not do any vigorous exercise, heavy lifting or bending forward. This could cause the wound to bleed.  If the wound is on your head, sleeping with your head elevated for the first few nights will help with swelling and bleeding. (Use linens/pillow cases that would be ok to get blood on in the event there is some oozing from the bandage).  Do not submerge the wound in any standing water for two weeks or until the site is healed.  Post-operative pain is usually mild.  You may alternate between 1000 mg of Tylenol (acetaminophen) and 400 mg of Ibuprofen every 4 hours.  This means, for example, that you could take the followin,000 mg of Tylenol, followed 4 hours later by 400 mg Ibuprofen, followed 4 hours later with 1,000 mg of Tylenol, and so forth.  Do not take more than 4,000 mg of acetaminophen in a 24-hour period or 3200 mg of Ibuprofen in a 24-hr period.    Avoid alcohol and vitamin E as these may increase your tendency to bleed.  Apply an ice pack for 20 min every 2-3 hours while awake.  This may help reduce swelling, bruising, and pain.  Make sure the ice pack is waterproof so that the pressure bandage doesn't get wet.  You may see a small amount of drainage or blood on your pressure bandage. This is normal. However:  If drainage or bleeding continues or saturates the bandage, you will need to apply firm pressure over the bandage with a clean washcloth for 15 minutes.    Remove the saturated bandage.  If bleeding has stopped, apply Vaseline over the suture line and cover with a non-stick bandage.  To add some pressure over the wound, fold a piece of gauze and tape over the area.  If bleeding continues after applying pressure for 15 minutes, apply an ice pack with gentle pressure to the  bandaged area for another 15 minutes.  If bleeding still continues, call our office or go to the nearest emergency room.    48 Hours After Surgery:  Your surgical site has been closed with DermaBond, a  super glue,   for skin procedures.  DermaBond seals your incision site.  Carefully remove the pressure bandage.  If it seems sticky or difficult to remove, you may need to soak it off in the shower.  Once the pressure bandage has been removed, avoid prolonged wetness to the area covered with DermaBond.  Pat dry when out of the shower.  Avoid rubbing or scrubbing the site.  Avoid topical medications, lotions, creams, ointment, or oils.  Cover with a non-stick bandage.    If skin glue and sutures are still intact at 2 weeks after your procedure, you can use an adhesive remover wipe to loosen the skin glue. If sutures are still present, start applying Vaseline daily and cover with non-stick gauze OR you may use hydrogen peroxide to help the sutures dissolve.       What to expect:  The first couple of days your wound may be tender and may bleed slightly when doing wound care.  There may be swelling and bruising around the wound, especially if it is near the eyes. For your comfort, you may apply ice or cold compresses to the area.  The area around your wound may be numb for several weeks or even months.  You may experience periodic sharp pain or mild itching around the wound as it heals.   The suture line will look dark pink at first and the edges of the wound will be reddened. This will lighten up each day.    Call Us If:  You have bleeding that will not stop after applying pressure and ice.  You have pain that is not controlled with Tylenol and Ibuprofen.  You have signs or symptoms of an infection such as fever over 100 degrees Fahrenheit, redness, swelling, or warmth spreading from the wound, increasing pain after the first 48 hours, or white/yellow/green drainage from the wound (may or may not have a foul  odor).    [facility], Dermatology Scheduling M-F 8-5: [phone].  Ask to speak with the staff in [place].  For urgent needs outside of business hours call the [facility] at [phone] and ask to speak with/page the dermatology resident on call.

## 2025-06-18 NOTE — PROGRESS NOTES
DERMATOLOGY EXCISION PROCEDURE NOTE    Dermatology Problem List:    1. History of hereditary leiomyomatosis  2. Subcutaneous nodule, L posterior shoulder, s/p excision 6/18/25  _______________________    NAME OF PROCEDURE: Excision with intermediate linear closure  Staff surgeon: Dr. Clint Neville MD   Fellow: Dr. Isabella Soliman  Scrub Nurse: Annita Lorenzana    PRE-OPERATIVE DIAGNOSIS:  subcutaneous nodule  POST-OPERATIVE DIAGNOSIS: Same   LOCATION: left posterior shoulder   FINAL EXCISION SIZE(DEFECT SIZE): 3.2 x 2.2 cm  MARGIN: 0 cm  FINAL REPAIR LENGTH: 7.8 cm   ANESTHESIA: 9 mL lidocaine, 3 mL 1% lidocaine with 1:100,000 epinephrine    INDICATIONS: This patient presented with a 3.2 x 2.2 cm subcutaneous nodule, favor epidermal inclusion cyst. Excision was indicated. We discussed the principles of treatment and most likely complications including scarring, bleeding, infection, incomplete excision, wound dehiscence, pain, nerve damage, and recurrence. Informed consent was obtained and the patient underwent the procedure as follows:    PROCEDURE: The patient was taken to the operative suite. Time-out was performed. The treatment area was anesthetized with 1% lidocaine with epinephrine. The area was prepped with Chlorhexidine and rinsed with sterile saline and draped with sterile towels. The lesion was delineated and excised down to subcutaneous fat in a elliptical manner. Hemostasis was obtained by electrocoagulation.     REPAIR: An intermediate layered linear closure was selected as the procedure which would maximally preserve both function and cosmesis.    After the excision of the tumor, the area was carefully undermined. Hemostasis was obtained with bipolar electrocoagulation. Closure was oriented so that the wound was in the patient's natural skin tension lines. The deeper layers of subcutaneous and superficial (nonmuscle) fascia tissues were then approximated using 3-0 Vicryl buried verticle mattress sutures (deep  layer suturing). The wound edges were then approximated; additional buried sutures were placed in a similar fashion where needed. 4-0 Monocryl sutures (superficial layer suturing) were carefully placed for maximum eversion and meticulous approximation. Dermabond was applied over the incision    Estimated blood loss was less than 10 ml for all surgical sites. A sterile pressure dressing was applied and wound care instructions, with a written handout, were given. The patient was discharged from the [facility] alert and ambulatory.    The patient elected for pathology results to automatically release and understands that the clinical staff will contact them as soon as possible to notify them of the results.    Follow-up: PRN    Dr. Clint Neville, was physically present  for the entire procedure, key portions of the reconstruction and always immediately available.     Anatomic Pathology Results: pending    Clinical Follow-Up: N/A     Staff Involved:  Scribe/Staff    Scribe Disclosure:   IIsabella, am serving as a scribe; to document services personally performed by Dr. Clint Neville - based on data collection and the provider's statements to me.     Electronically signed by:   Isabella Soliman MD  Mohs Micrographic Surgery and Dermatologic Oncology Fellow  [facility]    Staff Physician Comments:   I saw and evaluated the patient with the Mohs Surgery Fellow (Dr. Isabella Soliman) and I agree with the assessment and plan and the above description of the procedure as documented by the scribe. I was present for the key portions of the procedure and entire exam. I was immediately available in the clinic throughout the procedures.       Clint Neville DO    Department of Dermatology  [facility]: Phone: [phone], Fax:[phone]  [facility]: Phone: [phone], Fax:  [phone]

## 2025-06-18 NOTE — NURSING NOTE
The following medication was given:     MEDICATION:  Lidocaine HCL 1%   ROUTE: SQ  SITE: see procedure note  DOSE: 9 mL  LOT #: [id number]  : [employer]list games  EXPIRATION DATE: 11/30/2025  NDC#: 8354-8984-99  MEDICATION:  Lidocaine with epinephrine 1% [id number]  ROUTE: SQ  SITE: see procedure note  DOSE: 3.0 mL  LOT #: [id number]  : [employer]list games  EXPIRATION DATE: 11/30/2025  NDC#: 2020-7179-03  Was there drug waste? no  Multi-dose vial: Yes    Dermabond and pressure dressing applied to excision site on left posterior shoulder.  Wound care instructions reviewed with patient and AVS provided.  Patient verbalized understanding.  Post op appointment scheduled: Patient declined. Patient will follow up for suture removal: N/A  No further questions or concerns at this time.      Annita Lorenzana CMA  June 18, 2025

## 2025-06-19 ENCOUNTER — TELEPHONE (OUTPATIENT)
Dept: DERMATOLOGY | Facility: CLINIC | Age: 65
End: 2025-06-19
Payer: MEDICARE

## 2025-06-19 NOTE — TELEPHONE ENCOUNTER
Dakota is 1 day s/p excision on left upper arm.    What are you doing to manage your pain?  He is alternating between Tylenol and Ibuprofen.    Is it helping?  Yes    Are you applying ice? Yes    Have you had any noticeable bleeding through the bandage?  No, dressing is dry and intact.    Do you have any concerns?  No     Wound care directions reviewed.  Patient declined a post op appointment.     Abimbola Coulter RN

## 2025-06-23 ENCOUNTER — RESULTS FOLLOW-UP (OUTPATIENT)
Dept: DERMATOLOGY | Facility: CLINIC | Age: 65
End: 2025-06-23

## 2025-06-23 LAB
PATH REPORT.COMMENTS IMP SPEC: NORMAL
PATH REPORT.COMMENTS IMP SPEC: NORMAL
PATH REPORT.FINAL DX SPEC: NORMAL
PATH REPORT.GROSS SPEC: NORMAL
PATH REPORT.MICROSCOPIC SPEC OTHER STN: NORMAL
PATH REPORT.RELEVANT HX SPEC: NORMAL

## (undated) DEVICE — TUBING CONMED AIRSEAL SMOKE EVAC INSUFFLATION ASM-EVAC

## (undated) DEVICE — SUCTION MANIFOLD NEPTUNE 2 SYS 4 PORT 0702-020-000

## (undated) DEVICE — DAVINCI XI MONOPOLAR SCISSORS HOT SHEARS 8MM 470179

## (undated) DEVICE — PACK DAVINCI UROL

## (undated) DEVICE — PROTECTOR ARM ONE-STEP TRENDELENBURG 40418

## (undated) DEVICE — DAVINCI XI DRAPE COLUMN 470341

## (undated) DEVICE — DRAIN JACKSON PRATT ROUND SIL 19FR W/TROCAR LF JP-2232

## (undated) DEVICE — LINEN TOWEL PACK X6 WHITE 5487

## (undated) DEVICE — SU WND CLOSURE V-LOC 3-0 CV-23 6" VLOCM1904

## (undated) DEVICE — DAVINCI XI DRIVER NDL LARGE 8MM EXT 471006

## (undated) DEVICE — PACK GOWN 3/PK DISP XL SBA32GPFCB

## (undated) DEVICE — SYSTEM LAPAROVUE VISIBILITY LAPVUE10

## (undated) DEVICE — DAVINCI XI DRAPE ARM 470015

## (undated) DEVICE — DRAPE IOBAN INCISE 23X17" 6650EZ

## (undated) DEVICE — SU DERMABOND ADVANCED .7ML DNX12

## (undated) DEVICE — GLOVE GAMMEX NEOPRENE ULTRA SZ 7.5 LF 8515

## (undated) DEVICE — DEVICE SUTURE GRASPER TROCAR CLOSURE 14GA PMITCSG

## (undated) DEVICE — DAVINCI XI GRASPER ENDOWRIST PROGRASP 470093

## (undated) DEVICE — DRAPE SHEET REV FOLD 3/4 9349

## (undated) DEVICE — SURGICEL HEMOSTAT 4X8" 1952

## (undated) DEVICE — SU VICRYL 0 UR-6 27" J603H

## (undated) DEVICE — ENDO TROCAR CONMED AIRSEAL BLADELESS 12X120MM IAS12-120LP

## (undated) DEVICE — DAVINCI XI FORCEP TENACULUM 8MM 470207

## (undated) DEVICE — BLADE CLIPPER SGL USE 9680

## (undated) DEVICE — SOL NACL 0.9% INJ 1000ML BAG 2B1324X

## (undated) DEVICE — PREP POVIDONE-IODINE 7.5% SCRUB 4OZ BOTTLE MDS093945

## (undated) DEVICE — DAVINCI XI FCP BIPOLAR FENESTRATED 470205

## (undated) DEVICE — PREP CHLORAPREP 26ML TINTED HI-LITE ORANGE 930815

## (undated) DEVICE — SOL WATER IRRIG 1000ML BOTTLE 2F7114

## (undated) DEVICE — CLIP ENDO HEMO-LOC PURPLE LG 544240

## (undated) DEVICE — SOL NACL 0.9% IRRIG 1000ML BOTTLE 2F7124

## (undated) DEVICE — DAVINCI HOT SHEARS TIP COVER  400180

## (undated) DEVICE — ENDO OBTURATOR ACCESS PORT BLADELESS 12X150MM IAS12-150

## (undated) DEVICE — SU VICRYL 0 TIE 54" J608H

## (undated) DEVICE — DRAIN JACKSON PRATT RESERVOIR 100ML SU130-1305

## (undated) DEVICE — DAVINCI XI SEAL UNIVERSAL 5-8MM 470361

## (undated) DEVICE — LINEN TOWEL PACK X30 5481

## (undated) DEVICE — SU MONOCRYL 4-0 PS-2 27" UND Y426H

## (undated) DEVICE — PREP POVIDONE-IODINE 10% SOLUTION 4OZ BOTTLE MDS093944

## (undated) DEVICE — KIT PATIENT POSITIONING PIGAZZI LATEX FREE 40580

## (undated) DEVICE — NDL INSUFFLATION 13GA 120MM C2201

## (undated) DEVICE — DEVICE SUTURE PASSER 14GA WECK EFX EFXSP2

## (undated) DEVICE — DRSG PRIMAPORE 02X3" 7133

## (undated) DEVICE — SYR 10ML FINGER CONTROL W/O NDL 309695

## (undated) DEVICE — SU WND CLOSURE VLOC 90 ABS 3-0 VIOLET 6" CV-23 VLOCM0804

## (undated) DEVICE — DRAPE CV SPLIT II 147X106" 9158

## (undated) DEVICE — SU ETHILON 2-0 FS 18" 664H

## (undated) DEVICE — ENDO POUCH UNIV RETRIEVAL SYSTEM INZII 10MM CD001

## (undated) RX ORDER — ACETAMINOPHEN 325 MG/1
TABLET ORAL
Status: DISPENSED
Start: 2023-05-09

## (undated) RX ORDER — PROPOFOL 10 MG/ML
INJECTION, EMULSION INTRAVENOUS
Status: DISPENSED
Start: 2023-05-09

## (undated) RX ORDER — GABAPENTIN 300 MG/1
CAPSULE ORAL
Status: DISPENSED
Start: 2023-05-09

## (undated) RX ORDER — SIMETHICONE 40MG/0.6ML
SUSPENSION, DROPS(FINAL DOSAGE FORM)(ML) ORAL
Status: DISPENSED
Start: 2023-02-06

## (undated) RX ORDER — FENTANYL CITRATE 50 UG/ML
INJECTION, SOLUTION INTRAMUSCULAR; INTRAVENOUS
Status: DISPENSED
Start: 2023-05-09

## (undated) RX ORDER — ONDANSETRON 2 MG/ML
INJECTION INTRAMUSCULAR; INTRAVENOUS
Status: DISPENSED
Start: 2023-05-09

## (undated) RX ORDER — HYDROMORPHONE HYDROCHLORIDE 1 MG/ML
INJECTION, SOLUTION INTRAMUSCULAR; INTRAVENOUS; SUBCUTANEOUS
Status: DISPENSED
Start: 2023-05-09

## (undated) RX ORDER — KETOROLAC TROMETHAMINE 30 MG/ML
INJECTION, SOLUTION INTRAMUSCULAR; INTRAVENOUS
Status: DISPENSED
Start: 2023-05-09

## (undated) RX ORDER — CEFAZOLIN SODIUM 1 G/3ML
INJECTION, POWDER, FOR SOLUTION INTRAMUSCULAR; INTRAVENOUS
Status: DISPENSED
Start: 2023-05-09

## (undated) RX ORDER — CEFAZOLIN SODIUM/WATER 2 G/20 ML
SYRINGE (ML) INTRAVENOUS
Status: DISPENSED
Start: 2023-05-09

## (undated) RX ORDER — HEPARIN SODIUM 5000 [USP'U]/.5ML
INJECTION, SOLUTION INTRAVENOUS; SUBCUTANEOUS
Status: DISPENSED
Start: 2023-05-09

## (undated) RX ORDER — FENTANYL CITRATE 50 UG/ML
INJECTION, SOLUTION INTRAMUSCULAR; INTRAVENOUS
Status: DISPENSED
Start: 2023-02-06

## (undated) RX ORDER — SODIUM CHLORIDE, SODIUM LACTATE, POTASSIUM CHLORIDE, CALCIUM CHLORIDE 600; 310; 30; 20 MG/100ML; MG/100ML; MG/100ML; MG/100ML
INJECTION, SOLUTION INTRAVENOUS
Status: DISPENSED
Start: 2023-05-09

## (undated) RX ORDER — BUPIVACAINE HYDROCHLORIDE 2.5 MG/ML
INJECTION, SOLUTION EPIDURAL; INFILTRATION; INTRACAUDAL
Status: DISPENSED
Start: 2023-05-09